# Patient Record
Sex: FEMALE | Race: WHITE | NOT HISPANIC OR LATINO | Employment: FULL TIME | URBAN - METROPOLITAN AREA
[De-identification: names, ages, dates, MRNs, and addresses within clinical notes are randomized per-mention and may not be internally consistent; named-entity substitution may affect disease eponyms.]

---

## 2017-02-26 ENCOUNTER — OFFICE VISIT (OUTPATIENT)
Dept: RETAIL CLINIC | Facility: CLINIC | Age: 22
End: 2017-02-26

## 2017-02-26 DIAGNOSIS — J06.9 ACUTE UPPER RESPIRATORY INFECTION: ICD-10-CM

## 2017-02-26 DIAGNOSIS — J10.1 INFLUENZA A: Primary | ICD-10-CM

## 2017-02-26 LAB
EXPIRATION DATE: ABNORMAL
FLUAV AG NPH QL: POSITIVE
FLUBV AG NPH QL: NEGATIVE
INTERNAL CONTROL: ABNORMAL
Lab: ABNORMAL

## 2017-02-26 PROCEDURE — 99213 OFFICE O/P EST LOW 20 MIN: CPT | Performed by: NURSE PRACTITIONER

## 2017-02-26 PROCEDURE — 87804 INFLUENZA ASSAY W/OPTIC: CPT | Performed by: NURSE PRACTITIONER

## 2017-03-02 NOTE — PATIENT INSTRUCTIONS
See scanned documents   Computer issue not able to document on the epic  Talked to the patient about the diagnosis and educate the patient and advise to visit to PCP if the symptoms worsens

## 2017-07-19 ENCOUNTER — OFFICE VISIT (OUTPATIENT)
Dept: OBSTETRICS AND GYNECOLOGY | Age: 22
End: 2017-07-19

## 2017-07-19 VITALS
SYSTOLIC BLOOD PRESSURE: 120 MMHG | BODY MASS INDEX: 18.49 KG/M2 | WEIGHT: 122 LBS | HEIGHT: 68 IN | DIASTOLIC BLOOD PRESSURE: 78 MMHG

## 2017-07-19 DIAGNOSIS — Z01.419 WELL WOMAN EXAM WITH ROUTINE GYNECOLOGICAL EXAM: Primary | ICD-10-CM

## 2017-07-19 DIAGNOSIS — Z30.41 ENCOUNTER FOR SURVEILLANCE OF CONTRACEPTIVE PILLS: ICD-10-CM

## 2017-07-19 PROCEDURE — 99395 PREV VISIT EST AGE 18-39: CPT | Performed by: OBSTETRICS & GYNECOLOGY

## 2017-07-19 RX ORDER — NORGESTIMATE AND ETHINYL ESTRADIOL 0.25-0.035
1 KIT ORAL DAILY
Qty: 28 TABLET | Refills: 12 | Status: SHIPPED | OUTPATIENT
Start: 2017-07-19 | End: 2018-05-23 | Stop reason: SINTOL

## 2017-07-19 RX ORDER — MULTIPLE VITAMINS W/ MINERALS TAB 9MG-400MCG
1 TAB ORAL DAILY
COMMUNITY

## 2017-07-19 NOTE — PROGRESS NOTES
ANNUAL GYN EXAM        2017    Subjective     Richelle Burgess is a 21 y.o., , White  Female.    Chief Complaint   Patient presents with   • Gynecologic Exam     Annual       History of Present Illness:Here for annual exam, and contraceptive refill exam.     Gyn Complaints: None.    1.Menstrual Hx: Regular cycles on Sprintec. Moderate cramping x 2 days. Not heavy menses(x/c one day).    2.Pap Smear Hx: Reviewed new pap guidelines,  st Pap will be next year.    3.Breast / Ovarian Hx: Fairly Regular SBE.       Family history of breast cancer: None     Family history of ovarian cancer: None    4.Family Hx of Colon Ca: None     Family Hx of Uterine Ca: None    5. New Past Medical History: None   Past Medical History:   Diagnosis Date   • Contraception management    • History of chicken pox    • Scoliosis     Mild Scoliosis, no brace        6. New Past Surgical History: None   Past Surgical History:   Procedure Laterality Date   • CHOLECYSTECTOMY  2015    Stones, Dr Norton        7. New Family Medical History: None   Family History   Problem Relation Age of Onset   • Hypertension Father    • Diabetes Brother      Type 1   • Diabetes Maternal Grandmother      Type 2   • Heart attack Paternal Grandfather    • Lung cancer Paternal Grandmother      COPD, smoker.   • Hypertension Maternal Grandfather          8.   OB History    Para Term  AB SAB TAB Ectopic Multiple Living   0 0 0 0 0 0 0 0 0 0              9.   Health Maintenance   Topic Date Due   • TDAP/TD VACCINES (1 - Tdap) 2015   • INFLUENZA VACCINE  2017       The following portions of the patient's history were reviewed / updated as appropriate: allergies, current medications, past medical history, past surgical history, past family history, past social history, and problem list.    Review of Systems   Allergic/Immunologic: Positive for environmental allergies (seasonal allergies).   All other systems reviewed and are  "negative.      Objective     /78  Ht 67.5\" (171.5 cm)  Wt 122 lb (55.3 kg)  LMP 07/17/2017  BMI 18.83 kg/m2    PHYSICAL EXAM    Constitutional: Well-developed, well-nourished, thin white female, in no distress, alert and well oriented ×3.    Skin is warm, dry, without rash.       Neck appears normal, trachea in the midline.  Thyroid exam normal.          No cervical lymphadenopathy.    Lungs clear to auscultation.  Chest wall appears normal.    Heart with regular rhythm without murmur or gallop.    Breasts:  Self breast exam was taught, technique reviewed, strongly encouraged.        Right breast nontender, without dominant mass.  No nipple discharge.            No superficial skin changes.  No axillary adenopathy.        Left breast nontender, without dominant mass.  No nipple discharge.            No superficial skin changes.  No axillary adenopathy.      Abdomen is flat, soft and nontender.No palpable mass.           No hepatosplenomegaly.  Flanks are negative.          Bowel sounds normal.  No abdominal bruit.          No inguinal lymphadenopathy bilaterally.     Pelvic exam :  Vulva normal.  Tampon was removed.          External genitalia normal.  BUS negative.             Introitus normal.  Vaginal mucosa normal.  Small amount of menses at cervix.          Cervix normal, GEN probe for GC and chlamydia cervix taken (DAJUAN), no Pap smear done, no cervical motion tenderness.          Uterus anteverted and small size, normal shape, and position.          Adnexa are negative bilaterally.  No tenderness.  No palpable mass.          Rectovaginal exam deferred.    Musc /Skel: Lower extremities without edema.     Neuro: Coordination is grossly normal.  Gait is normal.    Psych: Mood and affect is normal.  Behavior normal.  Thought content normal.            Judgment normal.        Assessment/Plan   Richelle was seen today for gynecologic exam.    Diagnoses and all orders for this visit:    Well woman exam with " "routine gynecological exam  -     Chlamydia / GC DAJUAN, Confirmation    Encounter for surveillance of contraceptive pills  -     norgestimate-ethinyl estradiol (SPRINTEC 28) 0.25-35 MG-MCG per tablet; Take 1 tablet by mouth Daily.      COMMENTS: This was a very normal exam for Richelle.  Her Sprintec has been refilled.  She does regular [facility] [employer]  We discussed diet and also discussed calcium intake with either 2 glasses of milk a day or 600 mg of calcium (Os-Reese or Caltrate).    Ted Cooper MD          "

## 2017-07-23 LAB
C TRACH RRNA SPEC QL NAA+PROBE: NEGATIVE
N GONORRHOEA RRNA SPEC QL NAA+PROBE: NEGATIVE

## 2017-07-24 ENCOUNTER — TELEPHONE (OUTPATIENT)
Dept: OBSTETRICS AND GYNECOLOGY | Age: 22
End: 2017-07-24

## 2017-07-24 NOTE — TELEPHONE ENCOUNTER
----- Message from Ted Cooper MD sent at 7/23/2017 11:43 AM EDT -----  Notify that the vaginal/cervical swab for gonorrhea and chlamydia was negative.

## 2017-09-29 ENCOUNTER — HOSPITAL ENCOUNTER (EMERGENCY)
Facility: HOSPITAL | Age: 22
Discharge: HOME | End: 2017-09-29
Attending: EMERGENCY MEDICINE | Admitting: EMERGENCY MEDICINE

## 2017-09-29 VITALS
BODY MASS INDEX: 18.83 KG/M2 | SYSTOLIC BLOOD PRESSURE: 122 MMHG | TEMPERATURE: 97 F | RESPIRATION RATE: 15 BRPM | DIASTOLIC BLOOD PRESSURE: 86 MMHG | OXYGEN SATURATION: 100 % | HEART RATE: 93 BPM | HEIGHT: 67 IN | WEIGHT: 120 LBS

## 2017-09-29 DIAGNOSIS — N12 PYELONEPHRITIS: Primary | ICD-10-CM

## 2017-09-29 LAB
ALBUMIN SERPL-MCNC: 4.6 G/DL (ref 3.5–5.2)
ALBUMIN/GLOB SERPL: 1.5 G/DL
ALP SERPL-CCNC: 58 U/L (ref 39–117)
ALT SERPL W P-5'-P-CCNC: 10 U/L (ref 1–33)
ANION GAP SERPL CALCULATED.3IONS-SCNC: 14.6 MMOL/L
AST SERPL-CCNC: 11 U/L (ref 1–32)
BACTERIA UR QL AUTO: ABNORMAL /HPF
BASOPHILS # BLD AUTO: 0.04 10*3/MM3 (ref 0–0.2)
BASOPHILS NFR BLD AUTO: 0.3 % (ref 0–1.5)
BILIRUB SERPL-MCNC: 0.8 MG/DL (ref 0.1–1.2)
BILIRUB UR QL STRIP: NEGATIVE
BUN BLD-MCNC: 11 MG/DL (ref 6–20)
BUN/CREAT SERPL: 13.3 (ref 7–25)
CALCIUM SPEC-SCNC: 9.6 MG/DL (ref 8.6–10.5)
CHLORIDE SERPL-SCNC: 98 MMOL/L (ref 98–107)
CLARITY UR: ABNORMAL
CO2 SERPL-SCNC: 28.4 MMOL/L (ref 22–29)
COLOR UR: YELLOW
CREAT BLD-MCNC: 0.83 MG/DL (ref 0.57–1)
DEPRECATED RDW RBC AUTO: 43.4 FL (ref 37–54)
EOSINOPHIL # BLD AUTO: 0.09 10*3/MM3 (ref 0–0.7)
EOSINOPHIL NFR BLD AUTO: 0.6 % (ref 0.3–6.2)
ERYTHROCYTE [DISTWIDTH] IN BLOOD BY AUTOMATED COUNT: 11.9 % (ref 11.7–13)
GFR SERPL CREATININE-BSD FRML MDRD: 87 ML/MIN/1.73
GLOBULIN UR ELPH-MCNC: 3.1 GM/DL
GLUCOSE BLD-MCNC: 101 MG/DL (ref 65–99)
GLUCOSE UR STRIP-MCNC: NEGATIVE MG/DL
HCG SERPL QL: NEGATIVE
HCT VFR BLD AUTO: 48.1 % (ref 35.6–45.5)
HGB BLD-MCNC: 16.2 G/DL (ref 11.9–15.5)
HGB UR QL STRIP.AUTO: ABNORMAL
HOLD SPECIMEN: NORMAL
HOLD SPECIMEN: NORMAL
HYALINE CASTS UR QL AUTO: ABNORMAL /LPF
IMM GRANULOCYTES # BLD: 0.05 10*3/MM3 (ref 0–0.03)
IMM GRANULOCYTES NFR BLD: 0.3 % (ref 0–0.5)
KETONES UR QL STRIP: NEGATIVE
LEUKOCYTE ESTERASE UR QL STRIP.AUTO: ABNORMAL
LIPASE SERPL-CCNC: 55 U/L (ref 13–60)
LYMPHOCYTES # BLD AUTO: 2.37 10*3/MM3 (ref 0.9–4.8)
LYMPHOCYTES NFR BLD AUTO: 16.2 % (ref 19.6–45.3)
MCH RBC QN AUTO: 33.6 PG (ref 26.9–32)
MCHC RBC AUTO-ENTMCNC: 33.7 G/DL (ref 32.4–36.3)
MCV RBC AUTO: 99.8 FL (ref 80.5–98.2)
MONOCYTES # BLD AUTO: 0.85 10*3/MM3 (ref 0.2–1.2)
MONOCYTES NFR BLD AUTO: 5.8 % (ref 5–12)
NEUTROPHILS # BLD AUTO: 11.19 10*3/MM3 (ref 1.9–8.1)
NEUTROPHILS NFR BLD AUTO: 76.8 % (ref 42.7–76)
NITRITE UR QL STRIP: NEGATIVE
PH UR STRIP.AUTO: 6.5 [PH] (ref 5–8)
PLATELET # BLD AUTO: 294 10*3/MM3 (ref 140–500)
PMV BLD AUTO: 9.9 FL (ref 6–12)
POTASSIUM BLD-SCNC: 3.9 MMOL/L (ref 3.5–5.2)
PROT SERPL-MCNC: 7.7 G/DL (ref 6–8.5)
PROT UR QL STRIP: ABNORMAL
RBC # BLD AUTO: 4.82 10*6/MM3 (ref 3.9–5.2)
RBC # UR: ABNORMAL /HPF
REF LAB TEST METHOD: ABNORMAL
SODIUM BLD-SCNC: 141 MMOL/L (ref 136–145)
SP GR UR STRIP: 1.01 (ref 1–1.03)
SQUAMOUS #/AREA URNS HPF: ABNORMAL /HPF
UROBILINOGEN UR QL STRIP: ABNORMAL
WBC NRBC COR # BLD: 14.59 10*3/MM3 (ref 4.5–10.7)
WBC UR QL AUTO: ABNORMAL /HPF
WHOLE BLOOD HOLD SPECIMEN: NORMAL
WHOLE BLOOD HOLD SPECIMEN: NORMAL

## 2017-09-29 PROCEDURE — 87086 URINE CULTURE/COLONY COUNT: CPT | Performed by: EMERGENCY MEDICINE

## 2017-09-29 PROCEDURE — 87186 SC STD MICRODIL/AGAR DIL: CPT | Performed by: EMERGENCY MEDICINE

## 2017-09-29 PROCEDURE — 85025 COMPLETE CBC W/AUTO DIFF WBC: CPT | Performed by: EMERGENCY MEDICINE

## 2017-09-29 PROCEDURE — 80053 COMPREHEN METABOLIC PANEL: CPT | Performed by: EMERGENCY MEDICINE

## 2017-09-29 PROCEDURE — 81001 URINALYSIS AUTO W/SCOPE: CPT | Performed by: EMERGENCY MEDICINE

## 2017-09-29 PROCEDURE — 84703 CHORIONIC GONADOTROPIN ASSAY: CPT | Performed by: EMERGENCY MEDICINE

## 2017-09-29 PROCEDURE — 99284 EMERGENCY DEPT VISIT MOD MDM: CPT

## 2017-09-29 PROCEDURE — 36415 COLL VENOUS BLD VENIPUNCTURE: CPT

## 2017-09-29 PROCEDURE — 83690 ASSAY OF LIPASE: CPT | Performed by: EMERGENCY MEDICINE

## 2017-09-29 RX ORDER — HYDROCODONE BITARTRATE AND ACETAMINOPHEN 5; 325 MG/1; MG/1
1 TABLET ORAL EVERY 4 HOURS PRN
Qty: 12 TABLET | Refills: 0 | Status: SHIPPED | OUTPATIENT
Start: 2017-09-29 | End: 2017-10-17

## 2017-09-29 RX ORDER — CIPROFLOXACIN 500 MG/1
500 TABLET, FILM COATED ORAL EVERY 12 HOURS
Qty: 10 TABLET | Refills: 0 | Status: SHIPPED | OUTPATIENT
Start: 2017-09-29 | End: 2017-10-17

## 2017-09-29 RX ORDER — LEVOFLOXACIN 250 MG/1
500 TABLET ORAL ONCE
Status: COMPLETED | OUTPATIENT
Start: 2017-09-29 | End: 2017-09-29

## 2017-09-29 RX ORDER — SODIUM CHLORIDE 0.9 % (FLUSH) 0.9 %
10 SYRINGE (ML) INJECTION AS NEEDED
Status: DISCONTINUED | OUTPATIENT
Start: 2017-09-29 | End: 2017-09-29 | Stop reason: HOSPADM

## 2017-09-29 RX ORDER — SPIRONOLACTONE 100 MG/1
100 TABLET, FILM COATED ORAL DAILY
COMMUNITY
End: 2020-06-17

## 2017-09-29 RX ADMIN — LEVOFLOXACIN 500 MG: 250 TABLET, FILM COATED ORAL at 04:15

## 2017-10-01 LAB
BACTERIA SPEC AEROBE CULT: ABNORMAL
BACTERIA SPEC AEROBE CULT: ABNORMAL

## 2017-10-06 ENCOUNTER — TELEPHONE (OUTPATIENT)
Dept: SOCIAL WORK | Facility: HOSPITAL | Age: 22
End: 2017-10-06

## 2017-10-16 ENCOUNTER — TELEPHONE (OUTPATIENT)
Dept: OBSTETRICS AND GYNECOLOGY | Age: 22
End: 2017-10-16

## 2017-10-17 ENCOUNTER — OFFICE VISIT (OUTPATIENT)
Dept: OBSTETRICS AND GYNECOLOGY | Age: 22
End: 2017-10-17

## 2017-10-17 VITALS
BODY MASS INDEX: 19.25 KG/M2 | WEIGHT: 127 LBS | DIASTOLIC BLOOD PRESSURE: 68 MMHG | HEIGHT: 68 IN | SYSTOLIC BLOOD PRESSURE: 112 MMHG

## 2017-10-17 DIAGNOSIS — N76.1 SUBACUTE VAGINITIS: ICD-10-CM

## 2017-10-17 DIAGNOSIS — R39.15 URGENCY OF URINATION: ICD-10-CM

## 2017-10-17 DIAGNOSIS — N12 PYELONEPHRITIS: Primary | ICD-10-CM

## 2017-10-17 DIAGNOSIS — R35.0 URINE FREQUENCY: ICD-10-CM

## 2017-10-17 DIAGNOSIS — N72 CERVICITIS: ICD-10-CM

## 2017-10-17 LAB
BILIRUB BLD-MCNC: NEGATIVE MG/DL
GLUCOSE UR STRIP-MCNC: NEGATIVE MG/DL
KETONES UR QL: NEGATIVE
LEUKOCYTE EST, POC: ABNORMAL
NITRITE UR-MCNC: NEGATIVE MG/ML
PH UR: 7 [PH] (ref 5–8)
PROT UR STRIP-MCNC: NEGATIVE MG/DL
RBC # UR STRIP: NEGATIVE /UL
SP GR UR: 1.02 (ref 1–1.03)
UROBILINOGEN UR QL: NORMAL

## 2017-10-17 PROCEDURE — 87220 TISSUE EXAM FOR FUNGI: CPT | Performed by: OBSTETRICS & GYNECOLOGY

## 2017-10-17 PROCEDURE — 99213 OFFICE O/P EST LOW 20 MIN: CPT | Performed by: OBSTETRICS & GYNECOLOGY

## 2017-10-17 PROCEDURE — 87210 SMEAR WET MOUNT SALINE/INK: CPT | Performed by: OBSTETRICS & GYNECOLOGY

## 2017-10-17 PROCEDURE — 81003 URINALYSIS AUTO W/O SCOPE: CPT | Performed by: OBSTETRICS & GYNECOLOGY

## 2017-10-17 NOTE — PROGRESS NOTES
" GYN PROBLEM EXAM                                    2017    Subjective   Richelle Burgess is a 22 y.o. White Female,      Chief complaint:  Follow-up (Richelle was recently treated for pyelonephritis in the ER on her  birthday.  She continues to have symptoms, Urine frequency, urgency, dark urine color.  Also, she states vaginal discharge has fishy odor. )    History of Present Illness   Richelle was seen in the emergency room on her birthday , and treated for pyelonephritis.  After she finished her antibiotics, she has noticed the same symptoms of urgency, frequency, dark urine color, but no dysuria.  She has also noticed a vaginal discharge with fishy odor particularly after she has had intercourse.  However she does not have any itching or irritation.  They're having intercourse about 2 times a week.    The following portions of the patient's history were reviewed / updated as appropriate:   allergies, current medications,  past medical history, past surgical history, past family history, past social history, and problem list.    Review of Systems   Denies fever or chills.  No abdominal pain or change in bowel movements.    /68  Ht 67.5\" (171.5 cm)  Wt 127 lb (57.6 kg)  LMP 2017  Breastfeeding? No  BMI 19.6 kg/m2    Objective   OBGyn Exam     PHYSICAL EXAM     Well-developed, well-nourished, thin  female, in no distress, alert and well oriented ×3.       Abdomen is flat, soft and nontender.       Pelvic exam :  Vulva normal.           External genitalia normal.  BUS negative.             Introitus normal.  Vaginal mucosa normal.  Fairly copious yellow discharge.  However this is secondary to a marked cervical eversion.  Wet prep shows many WBCs, but no clue cells, no Trichomonas, no yeast.          Cervix normal, with a marked cervical eversion.  Gen-Probe for GC and chlamydia taken.  NuSwab of the vagina for vaginitis.           No cervical motion " tenderness.          Uterus midplane to anteverted, nontender, small size, normal shape, and position.          Adnexa are negative bilaterally.  No tenderness.  No palpable mass.          Rectovaginal exam deferred .         Mood and affect is normal.  Behavior normal.  Thought content normal.            Judgment normal.         Assessment/Plan   Richelle was seen today for follow-up.    Diagnoses and all orders for this visit:    Pyelonephritis  -     Urinalysis With / Microscopic If Indicated - Urine, Clean Catch  -     Urine Culture - Urine, Urine, Clean Catch  -     POC Urinalysis Dipstick, Automated    Urine frequency  -     POC Urinalysis Dipstick, Automated    Urgency of urination  -     POC Urinalysis Dipstick, Automated    Cervicitis  -     Chlamydia trachomatis, Neisseria gonorrhoeae, PCR w/ confirmation - Swab, Cervix    Subacute vaginitis  -     NuSwab Vaginitis (VG) - Swab, Vagina     COMMENTS: Because of the fishy odor that she notices after intercourse, we will check for bacterial vaginosis, but I don't think she has it based on the wet prep.  We will also repeat a urine culture to make sure she is not trying to start a new UTI.  We discussed strategies to avoid UTIs after intercourse.    Ted Cooper MD  10/18/2017

## 2017-10-19 LAB
APPEARANCE UR: ABNORMAL
BACTERIA #/AREA URNS HPF: ABNORMAL /HPF
BACTERIA UR CULT: ABNORMAL
BACTERIA UR CULT: ABNORMAL
BILIRUB UR QL STRIP: NEGATIVE
CASTS URNS MICRO: ABNORMAL
COLOR UR: YELLOW
EPI CELLS #/AREA URNS HPF: ABNORMAL /HPF
GLUCOSE UR QL: NEGATIVE
HGB UR QL STRIP: NEGATIVE
KETONES UR QL STRIP: NEGATIVE
LEUKOCYTE ESTERASE UR QL STRIP: ABNORMAL
NITRITE UR QL STRIP: NEGATIVE
OTHER ANTIBIOTIC SUSC ISLT: ABNORMAL
PH UR STRIP: 6.5 [PH] (ref 5–8)
PROT UR QL STRIP: ABNORMAL
RBC #/AREA URNS HPF: ABNORMAL /HPF
SP GR UR: 1.02 (ref 1–1.03)
UROBILINOGEN UR STRIP-MCNC: ABNORMAL MG/DL
WBC #/AREA URNS HPF: ABNORMAL /HPF

## 2017-10-20 ENCOUNTER — TELEPHONE (OUTPATIENT)
Dept: OBSTETRICS AND GYNECOLOGY | Age: 22
End: 2017-10-20

## 2017-10-20 RX ORDER — CIPROFLOXACIN 500 MG/1
500 TABLET, FILM COATED ORAL 2 TIMES DAILY
Qty: 14 TABLET | Refills: 0 | Status: SHIPPED | OUTPATIENT
Start: 2017-10-20 | End: 2017-10-27

## 2017-10-20 NOTE — TELEPHONE ENCOUNTER
Per Dr. Cooper  Pt urine culture was positive for a UTI  erx'd Cipro 500 mg #14  1 BID for 7 days     Pt notified

## 2017-10-21 LAB
C TRACH RRNA SPEC QL NAA+PROBE: NEGATIVE
N GONORRHOEA RRNA SPEC QL NAA+PROBE: NEGATIVE

## 2017-10-22 NOTE — PROGRESS NOTES
Notify Richelle that her urine culture showed greater than 100,000 colonies of Klebsiella.  It is resistant to Macrobid and sulfa but sensitive to Cipro.  Recommend ciprofloxacin 500 mg twice a day for 7 days, #14.  We might have called Richelle on Friday !!

## 2017-10-23 LAB
A VAGINAE DNA VAG QL NAA+PROBE: NORMAL SCORE
BVAB2 DNA VAG QL NAA+PROBE: NORMAL SCORE
C ALBICANS DNA VAG QL NAA+PROBE: NEGATIVE
C GLABRATA DNA VAG QL NAA+PROBE: NEGATIVE
MEGA1 DNA VAG QL NAA+PROBE: NORMAL SCORE
T VAGINALIS RRNA SPEC QL NAA+PROBE: NEGATIVE

## 2017-10-25 NOTE — PROGRESS NOTES
Notify Richelle that her test for BV and yeast and Trichomonas all came back negative.  The tests for gonorrhea and chlamydia were also negative.  So the only problem she really needs to deal with is getting the urinary tract infection cleared up.

## 2017-11-17 ENCOUNTER — TELEPHONE (OUTPATIENT)
Dept: OBSTETRICS AND GYNECOLOGY | Age: 22
End: 2017-11-17

## 2017-11-17 RX ORDER — NITROFURANTOIN 25; 75 MG/1; MG/1
100 CAPSULE ORAL 2 TIMES DAILY
Qty: 14 CAPSULE | Refills: 0 | Status: SHIPPED | OUTPATIENT
Start: 2017-11-17 | End: 2017-11-24

## 2018-05-22 NOTE — PROGRESS NOTES
Routine Annual Visit    2018    Patient: Richelle Burgess          MR#:[id number]    Chief Complaint   Patient presents with   • Gynecologic Exam     RICHELLE IS HERE FOR HER ANNUAL EXAM.  SHE HAS NEVER HAD A PAP SMEAR BEFORE. SHE IS EXPERIENCING IRREGULAR CYCLES SINCE STOPPING BCP IN JANUARY.  ALSO, PAIN WITH INTERCOURSE WITH BLEEDING AFTERWARDS.        22 y.o. female  who presents for annual exam.     HISTORY OF PRESENT ILLNESS:    GYN Complaints:  Discontinued BCP in 2018 due to side effects (mood). Now using condoms. She is having pain with intercourse and post coital bleeding.  Bright Red blood, small amount when she wipes. Pain is central and deep in the pelvis, not at the introitus. Doesn't alternate from side to side.     No change in discharge. No vaginal itching or irritation. No vaginal odor. No Recent antibiotics.    Other Complaints: None.    GYN HISTORY:  1.  Menstrual Hx:  Cycles have been irregular since stopping BCP in 2018,  cycle in Feb, None in March, and April, two cycles in May.                Current contraception: Condoms.    2.  History of abnormal Pap smear: no.        Pap smear Hx: First pap smear will be this year    NEW PAST MEDICAL HISTORY:    3.  New Medical Hx:  None.  Past Medical History:   Diagnosis Date   • Contraception management    • History of chicken pox    • Scoliosis     Mild Scoliosis, no brace           4.  New Surgical Hx:  None.  Past Surgical History:   Procedure Laterality Date   • CHOLECYSTECTOMY  2015    Stones, Dr Norton           5.  New Family Medical Hx:  None.   Family History   Problem Relation Age of Onset   • Hypertension Father    • Diabetes Brother         Type 1   • Diabetes Maternal Grandmother         Type 2   • Heart attack Paternal Grandfather    • Lung cancer Paternal Grandmother         COPD, smoker.   • Hypertension Maternal Grandfather    • Stroke Maternal Grandfather         Residual disability.         6.   "SCREENINGS:  =Family history of breast cancer: no  Perform regular self breast exam: yes - monthly    =Family history of ovarian cancer: no  =Family history of uterine cancer: no  =Family History of colon cancer: no      Mammogram: not indicated.  Colonoscopy: not indicated.  DEXA: not indicated.    7.  LIFESTYLE:  =Diet: Regular, Healthy.     =Exercise:  yes - three times per week.   =I recommended 30 minutes of aerobic exercise 5 times a week.     =Calcium  yes - Multivit.  =Vitamin D:  yes - Multivit.   = We discussed calcium intake needs to help prevent osteoporosis:      Recommended 1200 mg of calcium daily and 2000 IUs of vitamin D 3 daily.      Review of Systems   Constitutional: Negative.    HENT: Negative.    Genitourinary: Positive for dyspareunia and menstrual problem.        BLEEDING AFTER INTERCOURSE    All other systems reviewed and are negative.      Objective     /72   Ht 171.5 cm (67.5\")   Wt 54.9 kg (121 lb)   LMP 05/19/2018 (Exact Date)   Breastfeeding? No   BMI 18.67 kg/m²     PHYSICAL EXAM    Constitutional: Well-developed, well-nourished, thin  female, in no distress, alert and well oriented ×3.    Skin is warm, dry, without rash.       Neck appears normal, trachea in the midline.  Thyroid exam normal.          No cervical lymphadenopathy.    Lungs clear to auscultation.  Chest wall appears normal.    Heart with regular rhythm without murmur or gallop.    Breasts:         Right breast nontender, without dominant mass.  No nipple discharge.            No superficial skin changes.  No axillary adenopathy.        Left breast nontender, without dominant mass.  No nipple discharge.            No superficial skin changes.  No axillary adenopathy.      Abdomen is flat, soft and nontender.No palpable mass.           No hepatosplenomegaly.  Flanks are negative.          Bowel sounds normal.  No abdominal bruit.          No inguinal lymphadenopathy bilaterally.     Pelvic exam :  " Vulva normal.           External genitalia normal.  A little bit of menstrual blood present, no evidence of trauma.  BUS negative.             Introitus normal.  No lesions or evidence of trauma.  Vaginal mucosa normal.  Dark menses present.           Cervix normal, moderate cervical eversion, no polyps or lesions, Pap with reflex, might be obscured by blood..  No cervical motion tenderness.          Uterus anteverted, nontender, no tenderness with motion, normal size, normal shape, and position.          Adnexa: Right side negative, no tenderness no palpable mass.  Left side a little tender and a vague fullness present, ovary may be postovulatory.          Rectovaginal exam confirms that there is no mass in the pelvis. .      Musc /Skel: Lower extremities without edema.     Neuro: Coordination is grossly normal.  Gait is normal.    Psych: Mood and affect is normal.  Behavior normal.  Thought content normal.            Judgment normal.       PELVIC ULTRASOUND        Uterus:  Anteverted.  Measures 7.8 x 4.1 x 3.0 cm, with a volume of 51.32 cc's.  Endometrial stripe measures 3.36 mm and looks homogeneous and normal.  There were couple small nabothian cyst in the upper cervix and the lower uterine segment.     Left ovary:  Measures 3.6 x 2.5 x 1.9 cm, with a volume of 8.85 cc's.  There are multiple small follicles, couple little over a centimeter.  The left adnexa was more tender on exam than the right.     Right ovary:  Measures 4.9 x 4.3 x 3.1 cm, with a volume of 33.56 cc.  It contains a complex cyst with irregular collections of debris measuring 4.1 x 3.0 cm, and looks typical for a hemorrhagic ovarian cyst.  It is not homogeneous like an endometrioma.  Does not have any bright echoes like a dermoid cyst.     Cul-de-sac:  Contains a trace of free fluid. No masses.       Assessment:  Normal uterus and endometrial cavity.  Multiple small follicular cysts in the left ovary, a little tender on exam.  4 cm complex cyst  in the right ovary, not tender on exam, but has appearance of a hemorrhagic corpus luteal cyst.    Recommendation:  Cautioned about vigorous intercourse.  Don't want the right cyst to rupture.  I recommend repeating the ultrasound in 6 weeks to document started resolution of the right ovarian cyst.  Meanwhile we will check a  and a quantitative hCG.      =All questions were answered.      Assessment/Plan   Richelle was seen today for gynecologic exam.    Diagnoses and all orders for this visit:    Well woman exam with routine gynecological exam  -     POC Urinalysis Dipstick, Automated  -     Pap IG, Ct-Ng, Rfx HPV All    Screen for STD (sexually transmitted disease)  -     Pap IG, Ct-Ng, Rfx HPV All    PCB (post coital bleeding)    Dyspareunia, female    Cysts of both ovaries  -       -     HCG, B-subunit, Quantitative        COMMENTS: Her bleeding pattern and pelvic exam suggests ovulation bleeding currently.  The pelvic ultrasound shows a medium size complex cyst in the right ovary, that is surprisingly asymptomatic, since it looks most like a hemorrhagic corpus luteum.  However a dermoid cyst as a possibility.  Her discomfort was more in the left adnexa, and the left ovary had multiple small follicle cysts.  I will check a  and hCG, and repeat the ultrasound in 6-7 weeks.     Ted Cooper MD  5/23/2018

## 2018-05-23 ENCOUNTER — OFFICE VISIT (OUTPATIENT)
Dept: OBSTETRICS AND GYNECOLOGY | Age: 23
End: 2018-05-23

## 2018-05-23 ENCOUNTER — PROCEDURE VISIT (OUTPATIENT)
Dept: OBSTETRICS AND GYNECOLOGY | Age: 23
End: 2018-05-23

## 2018-05-23 VITALS
DIASTOLIC BLOOD PRESSURE: 72 MMHG | BODY MASS INDEX: 18.34 KG/M2 | HEIGHT: 68 IN | SYSTOLIC BLOOD PRESSURE: 104 MMHG | WEIGHT: 121 LBS

## 2018-05-23 DIAGNOSIS — R10.2 PELVIC PAIN: Primary | ICD-10-CM

## 2018-05-23 DIAGNOSIS — Z11.3 SCREEN FOR STD (SEXUALLY TRANSMITTED DISEASE): ICD-10-CM

## 2018-05-23 DIAGNOSIS — Z01.419 WELL WOMAN EXAM WITH ROUTINE GYNECOLOGICAL EXAM: Primary | ICD-10-CM

## 2018-05-23 DIAGNOSIS — N83.201 CYST OF RIGHT OVARY: ICD-10-CM

## 2018-05-23 DIAGNOSIS — N94.10 DYSPAREUNIA, FEMALE: ICD-10-CM

## 2018-05-23 DIAGNOSIS — N83.202 CYSTS OF BOTH OVARIES: ICD-10-CM

## 2018-05-23 DIAGNOSIS — N83.201 CYSTS OF BOTH OVARIES: ICD-10-CM

## 2018-05-23 DIAGNOSIS — N93.0 PCB (POST COITAL BLEEDING): ICD-10-CM

## 2018-05-23 LAB
BILIRUB BLD-MCNC: NEGATIVE MG/DL
CLARITY, POC: CLEAR
COLOR UR: YELLOW
GLUCOSE UR STRIP-MCNC: NEGATIVE MG/DL
KETONES UR QL: NEGATIVE
LEUKOCYTE EST, POC: NEGATIVE
NITRITE UR-MCNC: NEGATIVE MG/ML
PH UR: 6 [PH] (ref 5–8)
PROT UR STRIP-MCNC: ABNORMAL MG/DL
RBC # UR STRIP: ABNORMAL /UL
SP GR UR: 1.02 (ref 1–1.03)
UROBILINOGEN UR QL: NORMAL

## 2018-05-23 PROCEDURE — 99213 OFFICE O/P EST LOW 20 MIN: CPT | Performed by: OBSTETRICS & GYNECOLOGY

## 2018-05-23 PROCEDURE — 99395 PREV VISIT EST AGE 18-39: CPT | Performed by: OBSTETRICS & GYNECOLOGY

## 2018-05-23 PROCEDURE — 81003 URINALYSIS AUTO W/O SCOPE: CPT | Performed by: OBSTETRICS & GYNECOLOGY

## 2018-05-23 PROCEDURE — 76830 TRANSVAGINAL US NON-OB: CPT | Performed by: OBSTETRICS & GYNECOLOGY

## 2018-05-24 LAB
CANCER AG125 SERPL-ACNC: 18.1 U/ML (ref 0–38.1)
HCG INTACT+B SERPL-ACNC: <0.5 MIU/ML

## 2018-05-25 LAB
C TRACH RRNA CVX QL NAA+PROBE: NEGATIVE
CONV .: NORMAL
CYTOLOGIST CVX/VAG CYTO: NORMAL
CYTOLOGY CVX/VAG DOC THIN PREP: NORMAL
DX ICD CODE: NORMAL
HIV 1 & 2 AB SER-IMP: NORMAL
N GONORRHOEA RRNA CVX QL NAA+PROBE: NEGATIVE
OTHER STN SPEC: NORMAL
PATH REPORT.FINAL DX SPEC: NORMAL
STAT OF ADQ CVX/VAG CYTO-IMP: NORMAL

## 2018-05-30 ENCOUNTER — TELEPHONE (OUTPATIENT)
Dept: OBSTETRICS AND GYNECOLOGY | Age: 23
End: 2018-05-30

## 2018-05-30 NOTE — PROGRESS NOTES
May 29, 2018.  9:50 PM.  I called Richelle to let her daughter that her Pap smear was negative and also negative for GC and chlamydia.  Her serum pregnancy test was negative and the  was normal at 18.  She had some blood in her urine but she was at the end of her cycle.  We scheduled her for a follow-up pelvic ultrasound to check her ovarian cyst on July 6.  However I realized I will be out of town at [place] and so would prefer THAT WE SCHEDULE HER FOR THE FOLLOWING FRIDAY (I THINK SHE IS BETTER ON FRIDAYS) AND I CAN JUST MAKE A POINT OF BEING HERE THEN.

## 2018-07-13 ENCOUNTER — OFFICE VISIT (OUTPATIENT)
Dept: OBSTETRICS AND GYNECOLOGY | Age: 23
End: 2018-07-13

## 2018-07-13 ENCOUNTER — PROCEDURE VISIT (OUTPATIENT)
Dept: OBSTETRICS AND GYNECOLOGY | Age: 23
End: 2018-07-13

## 2018-07-13 VITALS
SYSTOLIC BLOOD PRESSURE: 114 MMHG | WEIGHT: 122 LBS | HEIGHT: 68 IN | DIASTOLIC BLOOD PRESSURE: 62 MMHG | BODY MASS INDEX: 18.49 KG/M2

## 2018-07-13 DIAGNOSIS — N83.201 CYST OF RIGHT OVARY: Primary | ICD-10-CM

## 2018-07-13 PROCEDURE — 99213 OFFICE O/P EST LOW 20 MIN: CPT | Performed by: OBSTETRICS & GYNECOLOGY

## 2018-07-13 PROCEDURE — 76830 TRANSVAGINAL US NON-OB: CPT | Performed by: OBSTETRICS & GYNECOLOGY

## 2018-07-13 RX ORDER — ETONOGESTREL AND ETHINYL ESTRADIOL 11.7; 2.7 MG/1; MG/1
INSERT, EXTENDED RELEASE VAGINAL
Qty: 1 EACH | Refills: 12 | Status: SHIPPED | OUTPATIENT
Start: 2018-07-13 | End: 2018-08-03 | Stop reason: SDUPTHER

## 2018-07-13 NOTE — PROGRESS NOTES
GYN FOLLOW UP/PELVIC ULTRASOUND            2018    Lew Burgess is a 22 y.o.  Female        Chief complaint:  Gynecologic Exam (Gyn u/s:follow up ovarian cyst)    History of Present Illness:   GYN history last visit May 23, 2018:      GYN Complaints:  Discontinued BCP in 2018 due to side effects (mood). Now using condoms. She is having pain with intercourse and post coital bleeding.  Bright Red blood, small amount when she wipes. Pain is central and deep in the pelvis, not at the introitus. Doesn't alternate from side to side.     No change in discharge. No vaginal itching or irritation. No vaginal odor. No Recent antibiotics.     GYN HISTORY:  1.  Menstrual Hx:  Cycles have been irregular since stopping BCP in 2018,  cycle in Feb, None in March, and April, two cycles in May.   2.  Contraception: Condoms.    PELVIC ULTRASOUND on May 23:      Uterus:  Anteverted.  Measures 7.8 x 4.1 x 3.0 cm, with a volume of 51.32 cc's.  Endometrial stripe measures 3.36 mm and looks homogeneous and normal.  There were couple small nabothian cyst in the upper cervix and the lower uterine segment.     Left ovary:  Measures 3.6 x 2.5 x 1.9 cm, with a volume of 8.85 cc's.  There are multiple small follicles, couple little over a centimeter.  The left adnexa was more tender on exam than the right.     Right ovary:  Measures 4.9 x 4.3 x 3.1 cm, with a volume of 33.56 cc.  It contains a complex cyst with irregular collections of debris measuring 4.1 x 3.0 cm, and looks typical for a hemorrhagic ovarian cyst.  It is not homogeneous like an endometrioma.  Does not have any bright echoes like a dermoid cyst.     Cul-de-sac:  Contains a trace of free fluid. No masses.       Assessment:  Normal uterus and endometrial cavity.  Multiple small follicular cysts in the left ovary, a little tender on exam.  4 cm complex cyst in the right ovary, not tender on exam, but has appearance of a  "hemorrhagic corpus luteal cyst.     Recommendation:  Cautioned about vigorous intercourse.  Don't want the right cyst to rupture.  I recommend repeating the ultrasound in 6 weeks to document started resolution of the right ovarian cyst.  Meanwhile we will check a  and a quantitative hCG.    Today's history:  was 19 in the hCG was negative.  LMP was June 22, 2018.  She still has some lower abdominal pain that seems to be more central as opposed to one side or the other.     Review of Systems: Still has some lower abdominal pain intermittently.  Does have intercourse, recommended carefully.  Some discomfort with deep penetration.    The following portions of the patient's history were reviewed / updated as appropriate:   allergies, current medications,  past medical history, past surgical history, past family history, past social history, and problem list.    Objective     /62   Ht 171.5 cm (67.5\")   Wt 55.3 kg (122 lb)   LMP 06/22/2018 (Approximate)   BMI 18.83 kg/m²     PHYSICAL EXAM     Gen'l : Thin  female, in no distress.  Well oriented ×3.       Abd   : No exam.       Pelvic: No exam.    PELVIC ULTRASOUND       Uterus: Anteverted.  Measures 7.5 x 4.2 x 3.3 cm, with a volume of 53.95 cc's.  Endometrial stripe is homogeneous and measures 5.0(4.98) mm.  No fibroids are seen.     Left ovary: Measures 2.5 x 1.4 x 1.0 cm, with a volume of 1.75 cc.  It looks pretty in-active.     Right ovary: Measures 4.8 x 5.5 x 3.1 cm, with a volume of 42.50 cc.  It contains either a large single complex cyst with resolving bands of hemorrhage or could be multiple focal smaller cysts measuring: 1.8 x 1.7 cm, 3.4 x 2.1 cm, measuring 2.4 x 1.8 cm.  There is very small, less than 1 cm cyst in the wall of one of the larger cyst.     Cul-de-sac: Contains no free fluid or masses.          ASSESSMENT: Overall hemorrhagic cyst of the right ovary with gradual evolution compared to previous exam on May 23.        " RECOMMENDATION: We discussed ovarian suppression with either birth control pills, which she did not tolerate well before, or NuvaRing or Depo-Provera.  Going over the pluses and minuses of these she has decided to pursue the NuvaRing and we will do this as a continuous application, not having withdrawal cycles.  Repeat her pelvic ultrasound after about 8 weeks.  She expects to start her current period this weekend.      Assessment/Plan   Richelle was seen today for gynecologic exam.    Diagnoses and all orders for this visit:    Cyst of right ovary  -     etonogestrel-ethinyl estradiol (NUVARING) 0.12-0.015 MG/24HR vaginal ring; Insert vaginally and leave in place for 3 consecutive weeks, then remove for 1 week.    COMMENTS: Pelvic ultrasound shows persistence of the right ovarian cyst although it appears to have evolved from a hemorrhagic ovarian cyst to either a single large cyst, slightly larger with resolving clot within it for several simple and complex cyst within the ovary.  It has not regressed and is producing mild symptoms.      As outlined in the recommendation above, she will start NuvaRing with her upcoming cycle which will probably be this weekend, using the Ring continuously for the next 3 months or so.  We will reevaluate the ovary with ultrasound after about 2 months.  She will call me if there is any worsening of her symptoms.    Ted Cooper M.D.         7/13/2018

## 2018-08-03 ENCOUNTER — TELEPHONE (OUTPATIENT)
Dept: OBSTETRICS AND GYNECOLOGY | Age: 23
End: 2018-08-03

## 2018-08-03 DIAGNOSIS — N83.201 CYST OF RIGHT OVARY: ICD-10-CM

## 2018-08-03 RX ORDER — ETONOGESTREL AND ETHINYL ESTRADIOL 11.7; 2.7 MG/1; MG/1
INSERT, EXTENDED RELEASE VAGINAL
Qty: 1 EACH | Refills: 12 | Status: SHIPPED | OUTPATIENT
Start: 2018-08-03 | End: 2020-06-17

## 2018-08-03 NOTE — TELEPHONE ENCOUNTER
I sent it in for her, but she can leave the nuva ring in place for 4 wks, then replace right away (it is effective for 4 wks)

## 2018-08-03 NOTE — TELEPHONE ENCOUNTER
Dr KEY has pt on Nuva ring continuously, could we send in new Rx reflecting this, ins is not allowing pt to  after 3 wks stating it is too soon, pharm on file, nka. . Please advise

## 2018-09-10 ENCOUNTER — TELEPHONE (OUTPATIENT)
Dept: OBSTETRICS AND GYNECOLOGY | Age: 23
End: 2018-09-10

## 2018-09-20 ENCOUNTER — OFFICE VISIT (OUTPATIENT)
Dept: OBSTETRICS AND GYNECOLOGY | Age: 23
End: 2018-09-20

## 2018-09-20 ENCOUNTER — PROCEDURE VISIT (OUTPATIENT)
Dept: OBSTETRICS AND GYNECOLOGY | Age: 23
End: 2018-09-20

## 2018-09-20 VITALS
BODY MASS INDEX: 18.49 KG/M2 | SYSTOLIC BLOOD PRESSURE: 124 MMHG | WEIGHT: 122 LBS | HEIGHT: 68 IN | DIASTOLIC BLOOD PRESSURE: 88 MMHG

## 2018-09-20 DIAGNOSIS — N92.6 IRREGULAR PERIODS/MENSTRUAL CYCLES: ICD-10-CM

## 2018-09-20 DIAGNOSIS — N92.6 IRREGULAR PERIODS/MENSTRUAL CYCLES: Primary | ICD-10-CM

## 2018-09-20 DIAGNOSIS — N83.201 CYST OF RIGHT OVARY: ICD-10-CM

## 2018-09-20 DIAGNOSIS — R10.2 PELVIC PAIN: ICD-10-CM

## 2018-09-20 DIAGNOSIS — N83.201 CYST OF RIGHT OVARY: Primary | ICD-10-CM

## 2018-09-20 LAB
B-HCG UR QL: NEGATIVE
INTERNAL NEGATIVE CONTROL: NEGATIVE
INTERNAL POSITIVE CONTROL: POSITIVE
Lab: NORMAL

## 2018-09-20 PROCEDURE — 99213 OFFICE O/P EST LOW 20 MIN: CPT | Performed by: OBSTETRICS & GYNECOLOGY

## 2018-09-20 PROCEDURE — 76830 TRANSVAGINAL US NON-OB: CPT | Performed by: OBSTETRICS & GYNECOLOGY

## 2018-09-20 PROCEDURE — 81025 URINE PREGNANCY TEST: CPT | Performed by: OBSTETRICS & GYNECOLOGY

## 2018-09-20 NOTE — PROGRESS NOTES
"GYN FOLLOW UP/PELVIC ULTRASOUND            2018.    Subjective   Richelle Burgess is a 22 y.o.  Female        Chief complaint:  Follow-up (irregular bleeding and ovarian cyst.  Using nuvaring in for two weeks out today.  Cycle started 2018 and did not stop until this .  Started bleeding again after sex on tuesday.  Pregnancy test = negative. )    History of Present Illness: Richelle is here for a follow-up ultrasound to evaluate her right ovarian cyst.      She had plan to use the NuvaRing continuously but left her first ring in for a total of 4 weeks, instead of 3, and then switched her NuvaRing, but then started bleeding about 2 weeks into the new NuvaRing.  Since 2017 she's had continuous bleeding until , when it finally stopped.  However she has had intercourse twice since then, and each time she has some postcoital bleeding.     She took the ring out today.     She has some pain with intercourse, it feels central, not sure if she feels it more on the right or left.  The pain has been less than what it was before.     Review of Systems: No vaginal discharge or itching or irritation.    The following portions of the patient's history were reviewed / updated as appropriate:   allergies, current medications,  past medical history, past surgical history, past family history, past social history, and problem list.    Objective     /88   Ht 171.5 cm (67.5\")   Wt 55.3 kg (122 lb)   LMP 2018   Breastfeeding? No   BMI 18.83 kg/m²     PHYSICAL EXAM     Gen'l : Well-developed, thin  female, in no distress.       Abd   : Abdomen flat, soft, not tender.       Pelvic: External genitalia and vagina normal.  Cervix normal small amount of blood.  No cervical motion tenderness.  Uterus is high in pelvis anteverted but not tender.  Adnexa negative.    PELVIC ULTRASOUND       Uterus: Anteverted.  Measures 8.1 x 4.7 x 4.0 cm, with " "a volume of 78.23 cc's.  Endometrial stripe measures 3.2 mm but there is a globular enlargement in the fundus measuring 9.2 mm x 5.6 mm.  This may either be a polyp or a clot.  This is also seen on 3-D, central in the uterine cavity.     Left ovary: Measures 1.4 x 1.9 x 1.2 cm, with a volume of 1.64 cc's.     Right ovary: Measures 1.5 x 2.4 x 2.0 cm, with a volume of 3.58 cc's.  There's been complete resolution of the right ovarian cyst seen before.     Cul-de-sac: No free fluid or mass.          ASSESSMENT: Normal anteverted uterus with either a clot or a polyp in the fundus.  Complete resolution of the right ovarian cyst.  No free fluid.       RECOMMENDATION: Richelle removed her NuvaRing today and I expect that she will start a period through the weekend.  Recommended she start back with the NuvaRing on Tuesday or Wednesday to continue using it continuously.  Also recommended she not have intercourse for the next couple weeks and we will see if she has any further bleeding.  We may want to repeat her ultrasound before she has intercourse again to see if this \"clot or polyp\"  in the fundus resolves.     The right ovarian cyst has completely resolved.      Assessment/Plan   Richelle was seen today for follow-up.    Diagnoses and all orders for this visit:    Irregular periods/menstrual cycles  Comments:  While using the NuvaRing.  Orders:  -     POC Pregnancy, Urine    Pelvic pain  -     Chlamydia trachomatis, Neisseria gonorrhoeae, PCR w/ confirmation - Swab, Cervix    Cyst of right ovary  Comments:  Completely resolved.      COMMENTS: Right ovarian cyst has resolved.  She will leave the ring out through the weekend and then try again next week and not have intercourse for 2 weeks.  We may want to look at an ultrasound again to see if this clot or polyp in the fundus is resolved if not and she continues to have irregular bleeding would want to consider hysteroscopy with D&C.    Ted Cooper M.D.         " 9/23/2018  (late note completion)

## 2018-09-23 LAB
C TRACH RRNA SPEC QL NAA+PROBE: NEGATIVE
N GONORRHOEA RRNA SPEC QL NAA+PROBE: NEGATIVE

## 2018-09-24 ENCOUNTER — TELEPHONE (OUTPATIENT)
Dept: OBSTETRICS AND GYNECOLOGY | Age: 23
End: 2018-09-24

## 2018-09-24 NOTE — TELEPHONE ENCOUNTER
Her insurance won't cover it for 3 wks with immediate replacement.  They require a PA which we can do if needed. The nuva ring is actually effective for 4 wks so she can keep one in for 4 wks, take it out and put a new one right back in.

## 2018-09-24 NOTE — TELEPHONE ENCOUNTER
Seen Dr Cooper last week, we sent in Rx for nuvaring to leave in for 4 weeks and replace immed. Dr KEY said to leave nuva ring in for 3 wks then replace immediately. A new Rx needs to be sent in to reflect that so insurance will pay for.

## 2018-09-24 NOTE — TELEPHONE ENCOUNTER
Pt stated she did leave in for 4 wks and started bleeding, Dr Cooper advised pt that was too long to leave in.   Start PA process?

## 2018-10-08 ENCOUNTER — TELEPHONE (OUTPATIENT)
Dept: OBSTETRICS AND GYNECOLOGY | Age: 23
End: 2018-10-08

## 2018-10-08 NOTE — TELEPHONE ENCOUNTER
I spoke with Richelle.  Her [employer] insurance is not giving us an approval to change her ring every 3 weeks, using it continuously.  I have a PA reference number, [id number].  I will need to try and call josue to get this approved.  Meanwhile Richelle will come by and  an extra ring or 2 to use continuously until we can get it authorized.

## 2018-10-08 NOTE — TELEPHONE ENCOUNTER
Patient states she is on week 3 of the nuva ring and has not had any break thru bleeding.  She is about to switch out for the next ring and wanted to give Dr. Cooper the update.

## 2018-10-16 ENCOUNTER — OFFICE VISIT (OUTPATIENT)
Dept: FAMILY MEDICINE CLINIC | Facility: CLINIC | Age: 23
End: 2018-10-16

## 2018-10-16 VITALS
TEMPERATURE: 98.7 F | HEIGHT: 68 IN | HEART RATE: 85 BPM | WEIGHT: 127 LBS | SYSTOLIC BLOOD PRESSURE: 108 MMHG | OXYGEN SATURATION: 98 % | BODY MASS INDEX: 19.25 KG/M2 | DIASTOLIC BLOOD PRESSURE: 74 MMHG

## 2018-10-16 DIAGNOSIS — R00.2 PALPITATIONS: ICD-10-CM

## 2018-10-16 DIAGNOSIS — Z13.1 SCREENING FOR DIABETES MELLITUS (DM): ICD-10-CM

## 2018-10-16 DIAGNOSIS — Z13.0 SCREENING FOR DEFICIENCY ANEMIA: ICD-10-CM

## 2018-10-16 DIAGNOSIS — F32.A ANXIETY AND DEPRESSION: Primary | ICD-10-CM

## 2018-10-16 DIAGNOSIS — F41.9 ANXIETY AND DEPRESSION: Primary | ICD-10-CM

## 2018-10-16 LAB
ALBUMIN SERPL-MCNC: 4.9 G/DL (ref 3.5–5.2)
ALBUMIN/GLOB SERPL: 1.9 G/DL
ALP SERPL-CCNC: 54 U/L (ref 39–117)
ALT SERPL-CCNC: 9 U/L (ref 1–33)
AST SERPL-CCNC: 10 U/L (ref 1–32)
BILIRUB SERPL-MCNC: 1.3 MG/DL (ref 0.1–1.2)
BUN SERPL-MCNC: 9 MG/DL (ref 6–20)
BUN/CREAT SERPL: 10.5 (ref 7–25)
CALCIUM SERPL-MCNC: 9.8 MG/DL (ref 8.6–10.5)
CHLORIDE SERPL-SCNC: 99 MMOL/L (ref 98–107)
CO2 SERPL-SCNC: 28.1 MMOL/L (ref 22–29)
CREAT SERPL-MCNC: 0.86 MG/DL (ref 0.57–1)
ERYTHROCYTE [DISTWIDTH] IN BLOOD BY AUTOMATED COUNT: 12.1 % (ref 11.7–13)
GLOBULIN SER CALC-MCNC: 2.6 GM/DL
GLUCOSE SERPL-MCNC: 88 MG/DL (ref 65–99)
HCT VFR BLD AUTO: 45.6 % (ref 35.6–45.5)
HGB BLD-MCNC: 15.4 G/DL (ref 11.9–15.5)
MCH RBC QN AUTO: 32.9 PG (ref 26.9–32)
MCHC RBC AUTO-ENTMCNC: 33.8 G/DL (ref 32.4–36.3)
MCV RBC AUTO: 97.4 FL (ref 80.5–98.2)
PLATELET # BLD AUTO: 343 10*3/MM3 (ref 140–500)
POTASSIUM SERPL-SCNC: 4.3 MMOL/L (ref 3.5–5.2)
PROT SERPL-MCNC: 7.5 G/DL (ref 6–8.5)
RBC # BLD AUTO: 4.68 10*6/MM3 (ref 3.9–5.2)
SODIUM SERPL-SCNC: 137 MMOL/L (ref 136–145)
TSH SERPL DL<=0.005 MIU/L-ACNC: 2.04 MIU/ML (ref 0.27–4.2)
WBC # BLD AUTO: 6.02 10*3/MM3 (ref 4.5–10.7)

## 2018-10-16 PROCEDURE — 99204 OFFICE O/P NEW MOD 45 MIN: CPT | Performed by: NURSE PRACTITIONER

## 2018-10-16 RX ORDER — CLINDAMYCIN PHOSPHATE 10 UG/ML
LOTION TOPICAL
COMMUNITY
Start: 2018-09-11 | End: 2021-10-05

## 2018-10-16 RX ORDER — SERTRALINE HYDROCHLORIDE 25 MG/1
25 TABLET, FILM COATED ORAL DAILY
Qty: 30 TABLET | Refills: 5 | Status: SHIPPED | OUTPATIENT
Start: 2018-10-16 | End: 2019-02-25 | Stop reason: DRUGHIGH

## 2018-10-16 NOTE — PROGRESS NOTES
Subjective   Richelle Burgess is a 23 y.o. female.     Chief Complaint   Patient presents with   • Anxiety     New pt to establish care     • Depression     Ms. Burgess presents today to establish care at this practice. She has not had a PCP since she had last seen her pediatrician. She states she is essentially healthy. She is concerned today because she is having increased anxiety. She has a lot of stress from going to graduate school, her grandfather recently had a stroke, her father lost his job and her mother is having a breast biopsy today. She states the anxiety started about a year ago but has been increasing over the past few months. There is a family history of anxiety and depression in her mother and brother. Associated symptoms are decreased concentration, depression, palpitations at times, feeling nervous/anxious and frustration/anger. She has not thought of harming herself or others.     I have reviewed the patient's medical history in detail and updated the computerized patient record.     The following portions of the patient's history were reviewed and updated as appropriate: allergies, current medications, past family history, past medical history, past social history, past surgical history and problem list.     Current Outpatient Prescriptions on File Prior to Visit   Medication Sig   • etonogestrel-ethinyl estradiol (NUVARING) 0.12-0.015 MG/24HR vaginal ring 1 pv x 4 wks, then replace immediately   • Multiple Vitamins-Minerals (MULTIVITAMIN WITH MINERALS) tablet tablet Take 1 tablet by mouth Daily.   • spironolactone (ALDACTONE) 100 MG tablet Take 100 mg by mouth Daily.     No current facility-administered medications on file prior to visit.        Review of Systems   Constitutional: Positive for activity change (no motivation).   Respiratory: Negative.    Cardiovascular: Positive for palpitations (sometimes).   Endocrine: Negative.    Musculoskeletal: Negative.    Neurological: Negative.     Psychiatric/Behavioral: Positive for agitation, decreased concentration and depressed mood. Negative for self-injury and suicidal ideas. The patient is nervous/anxious.        Objective    Vitals:    10/16/18 0843   BP: 108/74   Pulse: 85   Temp: 98.7 °F (37.1 °C)   SpO2: 98%     Physical Exam   Constitutional: She is oriented to person, place, and time. She appears well-developed and well-nourished.   HENT:   Right Ear: External ear normal.   Left Ear: External ear normal.   Mouth/Throat: Oropharynx is clear and moist.   Neck: Normal range of motion. Neck supple. No thyromegaly present.   Cardiovascular: Normal rate, regular rhythm, normal heart sounds and intact distal pulses.    Pulmonary/Chest: Effort normal and breath sounds normal.   Abdominal: Soft. Bowel sounds are normal.   Musculoskeletal: Normal range of motion. She exhibits no edema.   Lymphadenopathy:     She has no cervical adenopathy.   Neurological: She is alert and oriented to person, place, and time.   Skin: Skin is warm and dry.   Psychiatric:   No acute distress   Vitals reviewed.        Assessment/Plan   Richelle was seen today for anxiety and depression.    Diagnoses and all orders for this visit:    Anxiety and depression  -     sertraline (ZOLOFT) 25 MG tablet; Take 1 tablet by mouth Daily.    Palpitations  -     TSH    Screening for deficiency anemia  -     CBC (No Diff)    Screening for diabetes mellitus (DM)  -     Comprehensive Metabolic Panel    1. I have asked for her updated immunization records from her pediatrician.   2. I will assess her for diabetes, anemia and thyroid disorder as possible causes of anxiety/depression.  3. I will start her on Zoloft 25 mg daily.  4. She is to return in 4 weeks to follow up on anxiety/depression.

## 2018-11-13 ENCOUNTER — OFFICE VISIT (OUTPATIENT)
Dept: FAMILY MEDICINE CLINIC | Facility: CLINIC | Age: 23
End: 2018-11-13

## 2018-11-13 VITALS
WEIGHT: 122 LBS | BODY MASS INDEX: 18.49 KG/M2 | OXYGEN SATURATION: 98 % | RESPIRATION RATE: 14 BRPM | SYSTOLIC BLOOD PRESSURE: 120 MMHG | HEIGHT: 68 IN | HEART RATE: 78 BPM | TEMPERATURE: 98.1 F | DIASTOLIC BLOOD PRESSURE: 72 MMHG

## 2018-11-13 DIAGNOSIS — F32.A ANXIETY AND DEPRESSION: Primary | ICD-10-CM

## 2018-11-13 DIAGNOSIS — F41.9 ANXIETY AND DEPRESSION: Primary | ICD-10-CM

## 2018-11-13 PROCEDURE — 99213 OFFICE O/P EST LOW 20 MIN: CPT | Performed by: NURSE PRACTITIONER

## 2018-11-13 NOTE — PROGRESS NOTES
Subjective   Richelle Burgess is a 23 y.o. female.     Chief Complaint   Patient presents with   • Anxiety     1 month f/u    • Depression     Ms. Burgess presents today to follow up on anxiety/depression. I had initially seen her for anxiety/depression in October 2018. At that time her anxiety was interfering with her daily activities and causing her stress about school. I had started her on Zoloft 25 mg daily. She states that since starting the Zoloft her anxiety has decreased and she is able to concentrate in school. She states she feels much better.      I have reviewed the patient's medical history in detail and updated the computerized patient record.    The following portions of the patient's history were reviewed and updated as appropriate: allergies, current medications, past family history, past medical history, past social history, past surgical history and problem list.     Current Outpatient Medications on File Prior to Visit   Medication Sig Dispense Refill   • clindamycin (CLEOCIN T) 1 % lotion      • etonogestrel-ethinyl estradiol (NUVARING) 0.12-0.015 MG/24HR vaginal ring 1 pv x 4 wks, then replace immediately 1 each 12   • Multiple Vitamins-Minerals (MULTIVITAMIN WITH MINERALS) tablet tablet Take 1 tablet by mouth Daily.     • sertraline (ZOLOFT) 25 MG tablet Take 1 tablet by mouth Daily. 30 tablet 5   • spironolactone (ALDACTONE) 100 MG tablet Take 100 mg by mouth Daily.       No current facility-administered medications on file prior to visit.     s    Review of Systems   Constitutional: Negative.    Psychiatric/Behavioral: Negative for sleep disturbance. Decreased concentration: improving. Depressed mood: improving. Nervous/anxious: has improved a lot.         Vitals:    11/13/18 0845   BP: 120/72   Pulse: 78   Resp: 14   Temp: 98.1 °F (36.7 °C)   SpO2: 98%       Objective   Physical Exam   Constitutional: She is oriented to person, place, and time. She appears well-developed and well-nourished.    Cardiovascular: Regular rhythm and normal heart sounds.   Pulmonary/Chest: Effort normal and breath sounds normal.   Neurological: She is alert and oriented to person, place, and time.   Skin: Skin is warm and dry.   Psychiatric: She has a normal mood and affect. Her behavior is normal. Judgment and thought content normal.   Vitals reviewed.        Assessment/Plan   Richelle was seen today for anxiety and depression.    Diagnoses and all orders for this visit:    Anxiety and depression      1. Anxiety and depression has improved. She is to continue to take Zoloft 25 mg daily.   2. She is to continue all other medications as prescribed.  3. She is to follow up in October 2019 for her annual exam with fasting labs the week before.

## 2019-02-21 ENCOUNTER — TELEPHONE (OUTPATIENT)
Dept: OBSTETRICS AND GYNECOLOGY | Age: 24
End: 2019-02-21

## 2019-03-01 NOTE — TELEPHONE ENCOUNTER
Pt called Dr Cooper yesterday to verify that nuva rings would be [place] for her to  that evening. Pt didn't hear from Dr Cooper, so she didn't come. Pt mom Genesis gets off at 3:30 and will come pick the pt nuva rings up for her after work today.

## 2019-11-26 ENCOUNTER — OFFICE VISIT (OUTPATIENT)
Dept: FAMILY MEDICINE CLINIC | Facility: CLINIC | Age: 24
End: 2019-11-26

## 2019-11-26 VITALS
BODY MASS INDEX: 20.4 KG/M2 | SYSTOLIC BLOOD PRESSURE: 104 MMHG | TEMPERATURE: 98.3 F | WEIGHT: 130 LBS | RESPIRATION RATE: 16 BRPM | OXYGEN SATURATION: 98 % | HEART RATE: 85 BPM | DIASTOLIC BLOOD PRESSURE: 64 MMHG | HEIGHT: 67 IN

## 2019-11-26 DIAGNOSIS — J01.40 ACUTE NON-RECURRENT PANSINUSITIS: Primary | ICD-10-CM

## 2019-11-26 PROCEDURE — 99213 OFFICE O/P EST LOW 20 MIN: CPT | Performed by: NURSE PRACTITIONER

## 2019-11-26 RX ORDER — AMOXICILLIN AND CLAVULANATE POTASSIUM 875; 125 MG/1; MG/1
1 TABLET, FILM COATED ORAL 2 TIMES DAILY
Qty: 14 TABLET | Refills: 0 | Status: SHIPPED | OUTPATIENT
Start: 2019-11-26 | End: 2019-12-03

## 2019-11-26 NOTE — PROGRESS NOTES
Subjective   Richelle Burgess is a 24 y.o. female.     Chief Complaint   Patient presents with   • Cough   • Nasal Congestion     URI    This is a new problem. The current episode started in the past 7 days. The problem has been gradually worsening. There has been no fever. Associated symptoms include congestion, coughing, headaches, a plugged ear sensation, rhinorrhea and sinus pain. Pertinent negatives include no sore throat or wheezing. Treatments tried: OTC cough/cold medications. The treatment provided mild relief.      I have reviewed the patient's medical history in detail and updated the computerized patient record.    The following portions of the patient's history were reviewed and updated as appropriate: allergies, current medications, past family history, past medical history, past social history, past surgical history and problem list.       Current Outpatient Medications:   •  clindamycin (CLEOCIN T) 1 % lotion, , Disp: , Rfl:   •  etonogestrel-ethinyl estradiol (NUVARING) 0.12-0.015 MG/24HR vaginal ring, 1 pv x 4 wks, then replace immediately, Disp: 1 each, Rfl: 12  •  Multiple Vitamins-Minerals (MULTIVITAMIN WITH MINERALS) tablet tablet, Take 1 tablet by mouth Daily., Disp: , Rfl:   •  sertraline (ZOLOFT) 50 MG tablet, TAKE ONE TABLET BY MOUTH DAILY, Disp: 30 tablet, Rfl: 4  •  spironolactone (ALDACTONE) 100 MG tablet, Take 100 mg by mouth Daily., Disp: , Rfl:   •  amoxicillin-clavulanate (AUGMENTIN) 875-125 MG per tablet, Take 1 tablet by mouth 2 (Two) Times a Day for 7 days., Disp: 14 tablet, Rfl: 0    Review of Systems   Constitutional: Negative for chills and fever.   HENT: Positive for congestion and rhinorrhea. Negative for sore throat.    Respiratory: Positive for cough. Negative for shortness of breath and wheezing.    Cardiovascular: Negative.    Neurological: Positive for headache.        Vitals:    11/26/19 1009   BP: 104/64   BP Location: Left arm   Patient Position: Sitting   Cuff Size:  "Adult   Pulse: 85   Resp: 16   Temp: 98.3 °F (36.8 °C)   TempSrc: Oral   SpO2: 98%   Weight: 59 kg (130 lb)   Height: 170.2 cm (67\")       Objective   Physical Exam   Constitutional: She is oriented to person, place, and time. She appears well-developed and well-nourished. She has a sickly appearance.   HENT:   Right Ear: Tympanic membrane normal.   Left Ear: Tympanic membrane normal.   Nose: Mucosal edema, rhinorrhea and congestion present. Right sinus exhibits maxillary sinus tenderness and frontal sinus tenderness. Left sinus exhibits maxillary sinus tenderness and frontal sinus tenderness.   Mouth/Throat: Uvula is midline and mucous membranes are normal. Posterior oropharyngeal erythema present. No oropharyngeal exudate.   Cardiovascular: Normal rate, regular rhythm and normal heart sounds.   Pulmonary/Chest: Effort normal and breath sounds normal.   Neurological: She is alert and oriented to person, place, and time.   Skin: Skin is warm and dry.   Psychiatric:   No acute distress   Vitals reviewed.        Assessment/Plan   Richelle was seen today for cough and nasal congestion.    Diagnoses and all orders for this visit:    Acute non-recurrent pansinusitis    Other orders  -     amoxicillin-clavulanate (AUGMENTIN) 875-125 MG per tablet; Take 1 tablet by mouth 2 (Two) Times a Day for 7 days.        You have been diagnosed with acute sinusitis. You have been prescribed an antibiotic along with symptomatic treatment.  You may take Mucinex D for relieving congestion and cough.  If you have high blood pressure, do not take Mucinex D, instead opting for plain Mucinex and Coricidin HBP.  Take Ibuprofen or Tylenol as needed for pain or fever.  Oral antihistamine, such as Zyrtec or Claritin may help reduce ear pressure and relieve some nasal symptoms.  A saline nasal spray may be used to keep nose clear from discharge.  Be sure that you are increasing your intake of clear to decaffeinated fluids and get plenty of rest.  " If your symptoms worsen or persist follow up as needed.

## 2019-12-06 RX ORDER — FLUCONAZOLE 100 MG/1
100 TABLET ORAL DAILY
Qty: 3 TABLET | Refills: 0 | Status: SHIPPED | OUTPATIENT
Start: 2019-12-06 | End: 2019-12-09

## 2020-04-15 RX ORDER — ETONOGESTREL AND ETHINYL ESTRADIOL 11.7; 2.7 MG/1; MG/1
INSERT, EXTENDED RELEASE VAGINAL
Qty: 3 EACH | Refills: 1 | Status: CANCELLED | OUTPATIENT
Start: 2020-04-15

## 2020-06-17 ENCOUNTER — OFFICE VISIT (OUTPATIENT)
Dept: FAMILY MEDICINE CLINIC | Facility: CLINIC | Age: 25
End: 2020-06-17

## 2020-06-17 VITALS
TEMPERATURE: 98.4 F | SYSTOLIC BLOOD PRESSURE: 112 MMHG | BODY MASS INDEX: 21.03 KG/M2 | DIASTOLIC BLOOD PRESSURE: 80 MMHG | WEIGHT: 134 LBS | OXYGEN SATURATION: 99 % | HEART RATE: 85 BPM | HEIGHT: 67 IN

## 2020-06-17 DIAGNOSIS — F32.A ANXIETY AND DEPRESSION: Primary | ICD-10-CM

## 2020-06-17 DIAGNOSIS — F41.9 ANXIETY AND DEPRESSION: Primary | ICD-10-CM

## 2020-06-17 DIAGNOSIS — Z00.00 ANNUAL PHYSICAL EXAM: ICD-10-CM

## 2020-06-17 PROCEDURE — 99213 OFFICE O/P EST LOW 20 MIN: CPT | Performed by: NURSE PRACTITIONER

## 2020-06-17 RX ORDER — SERTRALINE HYDROCHLORIDE 25 MG/1
25 TABLET, FILM COATED ORAL DAILY
Qty: 90 TABLET | Refills: 3 | Status: SHIPPED | OUTPATIENT
Start: 2020-06-17 | End: 2020-09-15 | Stop reason: SDUPTHER

## 2020-06-17 NOTE — PROGRESS NOTES
Subjective   Richelle Burgess is a 24 y.o. female.     Chief Complaint   Patient presents with   • Anxiety     Ms Burgess presents today to follow up on her anxiety and depression. She reports that her anxiety and depression have improved significantly now that she has finished grad school and her mother is no longer going through chemotherapy and appears to be doing well. She would like to cut back on the Zoloft dose from 50 mg to 25 mg to see how she does on a lower dose.        I have reviewed the patient's medical history in detail and updated the computerized patient record.    The following portions of the patient's history were reviewed and updated as appropriate: allergies, current medications, past family history, past medical history, past social history, past surgical history and problem list.      Current Outpatient Medications:   •  clindamycin (CLEOCIN T) 1 % lotion, , Disp: , Rfl:   •  etonogestrel-ethinyl estradiol (NUVARING) 0.12-0.015 MG/24HR vaginal ring, insert 1 vaginal ring by vaginal route continously every 3 weeks, Disp: 3 each, Rfl: 1  •  Multiple Vitamins-Minerals (MULTIVITAMIN WITH MINERALS) tablet tablet, Take 1 tablet by mouth Daily., Disp: , Rfl:   •  sertraline (ZOLOFT) 50 MG tablet, Take 1 tablet by mouth Daily., Disp: 90 tablet, Rfl: 0     Review of Systems   Constitutional: Negative.    Respiratory: Negative.    Cardiovascular: Negative.    Gastrointestinal: Negative.    Psychiatric/Behavioral: Negative.  Negative for decreased concentration, depressed mood and stress (outside stressors of school and family illness is resolved). The patient is not nervous/anxious (has improved significantly with the medications).        Objective    Vitals:    06/17/20 1016   BP: 112/80   Pulse: 85   Temp: 98.4 °F (36.9 °C)   SpO2: 99%     Physical Exam   Constitutional: She is oriented to person, place, and time. She appears well-developed and well-nourished.   Cardiovascular: Normal rate, regular  rhythm, normal heart sounds and intact distal pulses.   Pulmonary/Chest: Breath sounds normal.   Neurological: She is alert and oriented to person, place, and time.   Skin: Skin is warm and dry.   Psychiatric:   No acute distress   Vitals reviewed.        Assessment/Plan   Richelle was seen today for anxiety.    Diagnoses and all orders for this visit:    Anxiety and depression  -     sertraline (ZOLOFT) 25 MG tablet; Take 1 tablet by mouth Daily.      Ms. Burgess appears to be doing well today. We will decrease the Zoloft from 50 mg daily to 25 mg daily and see how she does on the lower dose.   She is to follow up in 3 months for an annual physical exam with fasting labs the week before.

## 2020-08-26 ENCOUNTER — OFFICE VISIT (OUTPATIENT)
Dept: FAMILY MEDICINE CLINIC | Facility: CLINIC | Age: 25
End: 2020-08-26

## 2020-08-26 VITALS
BODY MASS INDEX: 21.66 KG/M2 | HEART RATE: 91 BPM | WEIGHT: 138 LBS | OXYGEN SATURATION: 98 % | DIASTOLIC BLOOD PRESSURE: 76 MMHG | SYSTOLIC BLOOD PRESSURE: 120 MMHG | HEIGHT: 67 IN | TEMPERATURE: 99.8 F

## 2020-08-26 DIAGNOSIS — L73.9 FOLLICULITIS: Primary | ICD-10-CM

## 2020-08-26 PROCEDURE — 99213 OFFICE O/P EST LOW 20 MIN: CPT | Performed by: NURSE PRACTITIONER

## 2020-08-26 RX ORDER — CEPHALEXIN 500 MG/1
500 CAPSULE ORAL 3 TIMES DAILY
Qty: 21 CAPSULE | Refills: 0 | Status: SHIPPED | OUTPATIENT
Start: 2020-08-26 | End: 2020-09-15

## 2020-08-26 RX ORDER — FLUCONAZOLE 100 MG/1
100 TABLET ORAL DAILY
Qty: 3 TABLET | Refills: 0 | Status: SHIPPED | OUTPATIENT
Start: 2020-08-26 | End: 2020-08-29

## 2020-08-26 NOTE — PROGRESS NOTES
Subjective   Richelle Burgess is a 24 y.o. female.     Chief Complaint   Patient presents with   • Wound Check     RT elbow     Ms. Burgess presents today with an infected hair follicle in her right antecubital space. She reports it started as a small white head that she squeezed. Within a day the area increased in size, and became red and painful. There is no drainage at this time.         I have reviewed the patient's medical history in detail and updated the computerized patient record.    The following portions of the patient's history were reviewed and updated as appropriate: allergies, current medications, past family history, past medical history, past social history, past surgical history and problem list.      Current Outpatient Medications:   •  clindamycin (CLEOCIN T) 1 % lotion, , Disp: , Rfl:   •  etonogestrel-ethinyl estradiol (NUVARING) 0.12-0.015 MG/24HR vaginal ring, Insert 1 vaginal ring by vaginal route continously every 3 weeks, Disp: 3 each, Rfl: 4  •  Multiple Vitamins-Minerals (MULTIVITAMIN WITH MINERALS) tablet tablet, Take 1 tablet by mouth Daily., Disp: , Rfl:   •  sertraline (ZOLOFT) 25 MG tablet, Take 1 tablet by mouth Daily., Disp: 90 tablet, Rfl: 3     Review of Systems   Constitutional: Negative for chills and fever.   Respiratory: Negative.    Cardiovascular: Negative.    Musculoskeletal: Negative.    Skin: Positive for skin lesions (infected pimple on right arm).   Neurological: Negative.        Objective    Vitals:    08/26/20 1429   BP: 120/76   Pulse: 91   Temp: 99.8 °F (37.7 °C)   SpO2: 98%     Physical Exam   Constitutional: She is oriented to person, place, and time. She appears well-developed and well-nourished.   Neurological: She is alert and oriented to person, place, and time.   Skin:   Infected hair follicle just on the outer aspect of her right antecubital fossa. The are is about .5 cm in diameter, red, swollen with a white center.    Vitals  reviewed.        Assessment/Plan   Richelle was seen today for wound check.    Diagnoses and all orders for this visit:    Folliculitis  -     cephalexin (KEFLEX) 500 MG capsule; Take 1 capsule by mouth 3 (Three) Times a Day.    Other orders  -     fluconazole (DIFLUCAN) 100 MG tablet; Take 1 tablet by mouth Daily for 3 days.      Ms. Burgess presents today with Folliculitis of the right arm. She is to start Keflex 500 mg three times a day for 7 days. She also get vaginal yeast infection while taking antibiotics, so she may take Fluconazole 100 mg x 3 days as instructed.  She has been instructed to keep the area on her arm clean and dry.  She is to follow up as needed and at her next scheduled appointment.

## 2020-09-06 ENCOUNTER — RESULTS ENCOUNTER (OUTPATIENT)
Dept: FAMILY MEDICINE CLINIC | Facility: CLINIC | Age: 25
End: 2020-09-06

## 2020-09-06 DIAGNOSIS — Z00.00 ANNUAL PHYSICAL EXAM: ICD-10-CM

## 2020-09-10 LAB
ALBUMIN SERPL-MCNC: 4.6 G/DL (ref 3.5–5.2)
ALBUMIN/GLOB SERPL: 2.7 G/DL
ALP SERPL-CCNC: 72 U/L (ref 39–117)
ALT SERPL-CCNC: 11 U/L (ref 1–33)
AST SERPL-CCNC: 13 U/L (ref 1–32)
BILIRUB SERPL-MCNC: 0.7 MG/DL (ref 0–1.2)
BUN SERPL-MCNC: 7 MG/DL (ref 6–20)
BUN/CREAT SERPL: 9.6 (ref 7–25)
CALCIUM SERPL-MCNC: 9.1 MG/DL (ref 8.6–10.5)
CHLORIDE SERPL-SCNC: 105 MMOL/L (ref 98–107)
CHOLEST SERPL-MCNC: 139 MG/DL (ref 0–200)
CO2 SERPL-SCNC: 28.7 MMOL/L (ref 22–29)
CREAT SERPL-MCNC: 0.73 MG/DL (ref 0.57–1)
ERYTHROCYTE [DISTWIDTH] IN BLOOD BY AUTOMATED COUNT: 12 % (ref 12.3–15.4)
GLOBULIN SER CALC-MCNC: 1.7 GM/DL
GLUCOSE SERPL-MCNC: 82 MG/DL (ref 65–99)
HCT VFR BLD AUTO: 41.3 % (ref 34–46.6)
HDLC SERPL-MCNC: 71 MG/DL (ref 40–60)
HGB BLD-MCNC: 14.1 G/DL (ref 12–15.9)
LDLC SERPL CALC-MCNC: 50 MG/DL (ref 0–100)
MCH RBC QN AUTO: 32 PG (ref 26.6–33)
MCHC RBC AUTO-ENTMCNC: 34.1 G/DL (ref 31.5–35.7)
MCV RBC AUTO: 93.7 FL (ref 79–97)
PLATELET # BLD AUTO: 296 10*3/MM3 (ref 140–450)
POTASSIUM SERPL-SCNC: 4.1 MMOL/L (ref 3.5–5.2)
PROT SERPL-MCNC: 6.3 G/DL (ref 6–8.5)
RBC # BLD AUTO: 4.41 10*6/MM3 (ref 3.77–5.28)
SODIUM SERPL-SCNC: 142 MMOL/L (ref 136–145)
TRIGL SERPL-MCNC: 90 MG/DL (ref 0–150)
VLDLC SERPL CALC-MCNC: 18 MG/DL
WBC # BLD AUTO: 6.41 10*3/MM3 (ref 3.4–10.8)

## 2020-09-15 ENCOUNTER — OFFICE VISIT (OUTPATIENT)
Dept: FAMILY MEDICINE CLINIC | Facility: CLINIC | Age: 25
End: 2020-09-15

## 2020-09-15 VITALS
BODY MASS INDEX: 21.66 KG/M2 | HEART RATE: 78 BPM | HEIGHT: 67 IN | DIASTOLIC BLOOD PRESSURE: 64 MMHG | WEIGHT: 138 LBS | TEMPERATURE: 98.4 F | OXYGEN SATURATION: 99 % | SYSTOLIC BLOOD PRESSURE: 116 MMHG

## 2020-09-15 DIAGNOSIS — Z00.00 ENCOUNTER FOR PREVENTIVE HEALTH EXAMINATION: Primary | ICD-10-CM

## 2020-09-15 DIAGNOSIS — F32.A ANXIETY AND DEPRESSION: ICD-10-CM

## 2020-09-15 DIAGNOSIS — F41.9 ANXIETY AND DEPRESSION: ICD-10-CM

## 2020-09-15 PROCEDURE — 99395 PREV VISIT EST AGE 18-39: CPT | Performed by: NURSE PRACTITIONER

## 2020-09-15 RX ORDER — SERTRALINE HYDROCHLORIDE 25 MG/1
25 TABLET, FILM COATED ORAL DAILY
Qty: 90 TABLET | Refills: 0 | Status: SHIPPED | OUTPATIENT
Start: 2020-09-15 | End: 2020-11-17 | Stop reason: SDUPTHER

## 2020-09-15 NOTE — PROGRESS NOTES
Subjective   Richelle Burgess is a 24 y.o. female.     Chief Complaint   Patient presents with   • Annual Exam     Ms. Burgess presents for an annual preventive health exam. She states she believes she was doing better on the 50 mg Zoloft than the 25 mg which was reduced at her last visit per her request. She was hoping to decrease the medication dose since her stress has decreased. She states she does not feel as if the Zoloft is doing anything.        I have reviewed the patient's medical history in detail and updated the computerized patient record.    The following portions of the patient's history were reviewed and updated as appropriate: allergies, current medications, past family history, past medical history, past social history, past surgical history and problem list.      Current Outpatient Medications:   •  clindamycin (CLEOCIN T) 1 % lotion, , Disp: , Rfl:   •  etonogestrel-ethinyl estradiol (NUVARING) 0.12-0.015 MG/24HR vaginal ring, Insert 1 vaginal ring by vaginal route continously every 3 weeks, Disp: 3 each, Rfl: 4  •  Multiple Vitamins-Minerals (MULTIVITAMIN WITH MINERALS) tablet tablet, Take 1 tablet by mouth Daily., Disp: , Rfl:   •  sertraline (ZOLOFT) 25 MG tablet, Take 1 tablet by mouth Daily., Disp: 90 tablet, Rfl: 3     Review of Systems   Constitutional: Negative.    HENT: Negative.    Eyes: Negative.    Respiratory: Negative.    Cardiovascular: Negative.    Gastrointestinal: Negative.    Endocrine: Negative.    Genitourinary: Negative.    Musculoskeletal: Negative.    Neurological: Negative.    Psychiatric/Behavioral: Negative.        Objective    Vitals:    09/15/20 1029   BP: 116/64   Pulse: 78   Temp: 98.4 °F (36.9 °C)   SpO2: 99%     Body mass index is 21.61 kg/m².     Physical Exam  Constitutional:       Appearance: Normal appearance. She is normal weight.   HENT:      Right Ear: Tympanic membrane normal.      Left Ear: Tympanic membrane normal.   Neck:      Musculoskeletal: Normal  range of motion.   Cardiovascular:      Rate and Rhythm: Normal rate and regular rhythm.      Pulses: Normal pulses.      Heart sounds: Normal heart sounds.   Pulmonary:      Effort: Pulmonary effort is normal.      Breath sounds: Normal breath sounds.   Musculoskeletal: Normal range of motion.         General: No swelling.   Skin:     General: Skin is warm and dry.   Neurological:      Mental Status: She is alert and oriented to person, place, and time. Mental status is at baseline.           Assessment/Plan   Richelle was seen today for annual exam.    Diagnoses and all orders for this visit:    Encounter for preventive health examination    Anxiety and depression  -     sertraline (ZOLOFT) 25 MG tablet; Take 1 tablet by mouth Daily.      Ms. Burgess's physical exam is unremarkable for her today.  I have reviewed her labs with her. All of her labs are normal. Her HDL is slightly elevated.  I have discussed with her about her anxiety. I have suggested she take 1.5 tabs of the Sertraline to see if this will help with her anxiety. If it does not she it to take 2 tabs for a total of 50 mg daily.  We discussed age appropriate healthy behaviors such as social distancing, wearing a mask, wearing seat belts, not drinking or textting when driving.   She is to follow up as needed and in 1 year for her next annual exam.

## 2020-11-17 DIAGNOSIS — F32.A ANXIETY AND DEPRESSION: ICD-10-CM

## 2020-11-17 DIAGNOSIS — F41.9 ANXIETY AND DEPRESSION: ICD-10-CM

## 2021-03-10 DIAGNOSIS — F41.9 ANXIETY AND DEPRESSION: ICD-10-CM

## 2021-03-10 DIAGNOSIS — F32.A ANXIETY AND DEPRESSION: ICD-10-CM

## 2021-04-16 ENCOUNTER — BULK ORDERING (OUTPATIENT)
Dept: CASE MANAGEMENT | Facility: OTHER | Age: 26
End: 2021-04-16

## 2021-04-16 DIAGNOSIS — Z23 IMMUNIZATION DUE: ICD-10-CM

## 2021-08-30 DIAGNOSIS — Z00.00 ANNUAL PHYSICAL EXAM: Primary | ICD-10-CM

## 2021-08-30 DIAGNOSIS — Z11.59 ENCOUNTER FOR HEPATITIS C SCREENING TEST FOR LOW RISK PATIENT: ICD-10-CM

## 2021-10-05 ENCOUNTER — OFFICE VISIT (OUTPATIENT)
Dept: FAMILY MEDICINE CLINIC | Facility: CLINIC | Age: 26
End: 2021-10-05

## 2021-10-05 VITALS
HEART RATE: 91 BPM | BODY MASS INDEX: 21.03 KG/M2 | DIASTOLIC BLOOD PRESSURE: 68 MMHG | TEMPERATURE: 98.6 F | WEIGHT: 134 LBS | OXYGEN SATURATION: 99 % | SYSTOLIC BLOOD PRESSURE: 98 MMHG | HEIGHT: 67 IN

## 2021-10-05 DIAGNOSIS — R19.7 DIARRHEA, UNSPECIFIED TYPE: Primary | ICD-10-CM

## 2021-10-05 DIAGNOSIS — F41.9 ANXIETY AND DEPRESSION: ICD-10-CM

## 2021-10-05 DIAGNOSIS — F32.A ANXIETY AND DEPRESSION: ICD-10-CM

## 2021-10-05 PROCEDURE — 99213 OFFICE O/P EST LOW 20 MIN: CPT | Performed by: NURSE PRACTITIONER

## 2021-10-05 RX ORDER — METRONIDAZOLE 250 MG/1
250 TABLET ORAL 3 TIMES DAILY
Qty: 30 TABLET | Refills: 0 | Status: ON HOLD | OUTPATIENT
Start: 2021-10-05 | End: 2022-05-14

## 2021-10-05 RX ORDER — FLUCONAZOLE 100 MG/1
100 TABLET ORAL DAILY
Qty: 3 TABLET | Refills: 0 | Status: SHIPPED | OUTPATIENT
Start: 2021-10-05 | End: 2021-10-08

## 2021-10-05 NOTE — PROGRESS NOTES
"Chief Complaint  Diarrhea (Since Sunday - 2 bouts during sleep) and Abdominal Pain (Since Saturday)    Subjective          Richelle Burgess presents to [facility]  Diarrhea   This is a new problem. The current episode started in the past 7 days (started 3 days ago). The stool consistency is described as watery. Associated symptoms include bloating. Pertinent negatives include no chills, coughing or sweats. Nothing aggravates the symptoms. 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 She has tried change of diet for the symptoms. The treatment provided no relief.     Current Outpatient Medications on File Prior to Visit   Medication Sig   • etonogestrel-ethinyl estradiol (NUVARING) 0.12-0.015 MG/24HR vaginal ring Insert one ring intravaginally for 3 weeks, then remove for one week, as directed   • Multiple Vitamins-Minerals (MULTIVITAMIN WITH MINERALS) tablet tablet Take 1 tablet by mouth Daily.   • [DISCONTINUED] sertraline (ZOLOFT) 50 MG tablet Take 1 tablet by mouth Daily.   • [DISCONTINUED] clindamycin (CLEOCIN T) 1 % lotion    • [DISCONTINUED] etonogestrel-ethinyl estradiol (NUVARING) 0.12-0.015 MG/24HR vaginal ring Insert 1 vaginal ring by vaginal route continously every 3 weeks   • [DISCONTINUED] etonogestrel-ethinyl estradiol (NUVARING) 0.12-0.015 MG/24HR vaginal ring Insert 1 each into the vagina Every 21 (Twenty-One) Days continuously     No current facility-administered medications on file prior to visit.     Objective   Vital Signs:   BP 98/68 (BP Location: Right arm, Patient Position: Sitting, Cuff Size: Adult)   Pulse 91   Temp 98.6 °F (37 °C) (Oral)   Ht 170.2 cm (67\")   Wt 60.8 kg (134 lb)   SpO2 99%   BMI 20.99 kg/m²     Physical Exam  Vitals reviewed.   Constitutional:       Appearance: Normal appearance. She is normal weight.   Cardiovascular:      Rate and Rhythm: Normal rate and regular rhythm.      Pulses: Normal pulses.      Heart sounds: Normal heart sounds. "   Pulmonary:      Effort: Pulmonary effort is normal.      Breath sounds: Normal breath sounds.   Abdominal:      General: Abdomen is flat. Bowel sounds are decreased.      Palpations: Abdomen is soft.   Skin:     General: Skin is warm and dry.   Neurological:      Mental Status: She is alert and oriented to person, place, and time.   Psychiatric:      Comments: No acute distress        Result Review :                 Assessment and Plan    Diagnoses and all orders for this visit:    1. Diarrhea, unspecified type (Primary)  -     Ova & Parasite Examination - Stool, Per Rectum  -     Clostridium Difficile EIA - Stool, Per Rectum  -     Stool Culture (Reference Lab) - Stool, Per Rectum  -     metroNIDAZOLE (FLAGYL) 250 MG tablet; Take 1 tablet by mouth 3 (Three) Times a Day.  Dispense: 30 tablet; Refill: 0    2. Anxiety and depression  -     sertraline (ZOLOFT) 50 MG tablet; Take 1 tablet by mouth Daily.  Dispense: 90 tablet; Refill: 1    Other orders  -     fluconazole (DIFLUCAN) 100 MG tablet; Take 1 tablet by mouth Daily for 3 days.  Dispense: 3 tablet; Refill: 0    Ms. Burgess presents with complaints of diarrhea x 3 days. She reports she has been incontinent of diarrhea in her sleep x 2 nights. I have ordered the above labs for further evaluation. She is to start Flagyl 250 mg three times a day after she collects the stool specimens. She may also take OTC imodium for her symptoms.   I have provided her with Fluconazole because she reports getting vaginal yeast infections when taking antibiotics. I have also refilled her Zoloft for her today.     Follow Up   Return if symptoms worsen or fail to improve, for Next scheduled follow up.  Patient was given instructions and counseling regarding her condition or for health maintenance advice. Please see specific information pulled into the AVS if appropriate.

## 2021-10-12 ENCOUNTER — TELEPHONE (OUTPATIENT)
Dept: FAMILY MEDICINE CLINIC | Facility: CLINIC | Age: 26
End: 2021-10-12

## 2021-10-12 DIAGNOSIS — F41.9 ANXIETY AND DEPRESSION: ICD-10-CM

## 2021-10-12 DIAGNOSIS — F32.A ANXIETY AND DEPRESSION: ICD-10-CM

## 2021-10-12 NOTE — TELEPHONE ENCOUNTER
Jennifer RIVERA sent [employer] message to patient on 10/6/21.  Patient has not read the message.  Called and left message on patient's voicemail letting her know that if she is no longer having the symptoms, then she will not have to collect the samples.

## 2021-10-12 NOTE — TELEPHONE ENCOUNTER
Caller: Richelle Burgess    Relationship to patient: Self    Best call back number: [phone]     Patient is needing: PATIENT IS CALLING TO REQUEST THE FOLLOWING PRESCRIPTION TO BE SENT TO [facility] LISTED BELOW.  SHE STATES HER MOTHER GETS A DISCOUNTED PRICE AT [facility].  SHE STATES IT WAS SENT TO A LOCAL [facility] ON 10/05/21.  sertraline (ZOLOFT) 50 MG tablet       [facility]  [phone]

## 2022-05-02 ENCOUNTER — PREP FOR SURGERY (OUTPATIENT)
Dept: OTHER | Facility: HOSPITAL | Age: 27
End: 2022-05-02

## 2022-05-02 DIAGNOSIS — K62.5 RECTAL BLEEDING: Primary | ICD-10-CM

## 2022-05-03 ENCOUNTER — TELEPHONE (OUTPATIENT)
Dept: GASTROENTEROLOGY | Facility: CLINIC | Age: 27
End: 2022-05-03

## 2022-05-05 PROBLEM — K62.5 RECTAL BLEEDING: Status: ACTIVE | Noted: 2022-05-05

## 2022-05-05 NOTE — TELEPHONE ENCOUNTER
Spoke with pt scheduled at [facility] on sept 21 arrive at 1000 am [place]---[employer] instructional packet mailed

## 2022-05-11 DIAGNOSIS — K62.5 RECTAL BLEEDING: Primary | ICD-10-CM

## 2022-05-13 ENCOUNTER — ANESTHESIA EVENT (OUTPATIENT)
Dept: GASTROENTEROLOGY | Facility: HOSPITAL | Age: 27
End: 2022-05-13

## 2022-05-14 ENCOUNTER — HOSPITAL ENCOUNTER (OUTPATIENT)
Facility: HOSPITAL | Age: 27
Setting detail: HOSPITAL OUTPATIENT SURGERY
Discharge: HOME | End: 2022-05-14
Attending: INTERNAL MEDICINE | Admitting: INTERNAL MEDICINE

## 2022-05-14 ENCOUNTER — ANESTHESIA (OUTPATIENT)
Dept: GASTROENTEROLOGY | Facility: HOSPITAL | Age: 27
End: 2022-05-14

## 2022-05-14 VITALS
HEIGHT: 67 IN | OXYGEN SATURATION: 98 % | DIASTOLIC BLOOD PRESSURE: 85 MMHG | WEIGHT: 129.6 LBS | HEART RATE: 81 BPM | SYSTOLIC BLOOD PRESSURE: 115 MMHG | RESPIRATION RATE: 16 BRPM | BODY MASS INDEX: 20.34 KG/M2

## 2022-05-14 DIAGNOSIS — K62.5 RECTAL BLEEDING: ICD-10-CM

## 2022-05-14 LAB
B-HCG UR QL: NEGATIVE
EXPIRATION DATE: NORMAL
INTERNAL NEGATIVE CONTROL: NEGATIVE
INTERNAL POSITIVE CONTROL: POSITIVE
Lab: NORMAL

## 2022-05-14 PROCEDURE — 81025 URINE PREGNANCY TEST: CPT | Performed by: INTERNAL MEDICINE

## 2022-05-14 PROCEDURE — S0260 H&P FOR SURGERY: HCPCS | Performed by: INTERNAL MEDICINE

## 2022-05-14 PROCEDURE — 25010000002 PROPOFOL 10 MG/ML EMULSION: Performed by: ANESTHESIOLOGY

## 2022-05-14 PROCEDURE — 45378 DIAGNOSTIC COLONOSCOPY: CPT | Performed by: INTERNAL MEDICINE

## 2022-05-14 RX ORDER — PROPOFOL 10 MG/ML
VIAL (ML) INTRAVENOUS CONTINUOUS PRN
Status: DISCONTINUED | OUTPATIENT
Start: 2022-05-14 | End: 2022-05-14 | Stop reason: SURG

## 2022-05-14 RX ORDER — SODIUM CHLORIDE 0.9 % (FLUSH) 0.9 %
10 SYRINGE (ML) INJECTION AS NEEDED
Status: DISCONTINUED | OUTPATIENT
Start: 2022-05-14 | End: 2022-05-14 | Stop reason: HOSPADM

## 2022-05-14 RX ORDER — HYDROCORTISONE 25 MG/G
CREAM TOPICAL 2 TIMES DAILY
Qty: 28 G | Refills: 2 | Status: SHIPPED | OUTPATIENT
Start: 2022-05-14 | End: 2022-05-24

## 2022-05-14 RX ORDER — LIDOCAINE HYDROCHLORIDE 20 MG/ML
INJECTION, SOLUTION INFILTRATION; PERINEURAL AS NEEDED
Status: DISCONTINUED | OUTPATIENT
Start: 2022-05-14 | End: 2022-05-14 | Stop reason: SURG

## 2022-05-14 RX ORDER — SODIUM CHLORIDE, SODIUM LACTATE, POTASSIUM CHLORIDE, CALCIUM CHLORIDE 600; 310; 30; 20 MG/100ML; MG/100ML; MG/100ML; MG/100ML
30 INJECTION, SOLUTION INTRAVENOUS CONTINUOUS PRN
Status: DISCONTINUED | OUTPATIENT
Start: 2022-05-14 | End: 2022-05-14 | Stop reason: HOSPADM

## 2022-05-14 RX ORDER — PROPOFOL 10 MG/ML
VIAL (ML) INTRAVENOUS AS NEEDED
Status: DISCONTINUED | OUTPATIENT
Start: 2022-05-14 | End: 2022-05-14 | Stop reason: SURG

## 2022-05-14 RX ORDER — SODIUM CHLORIDE 0.9 % (FLUSH) 0.9 %
10 SYRINGE (ML) INJECTION EVERY 12 HOURS SCHEDULED
Status: DISCONTINUED | OUTPATIENT
Start: 2022-05-14 | End: 2022-05-14 | Stop reason: HOSPADM

## 2022-05-14 RX ADMIN — PROPOFOL 100 MG: 10 INJECTION, EMULSION INTRAVENOUS at 08:22

## 2022-05-14 RX ADMIN — LIDOCAINE HYDROCHLORIDE 60 MG: 20 INJECTION, SOLUTION INFILTRATION; PERINEURAL at 08:21

## 2022-05-14 RX ADMIN — SODIUM CHLORIDE, POTASSIUM CHLORIDE, SODIUM LACTATE AND CALCIUM CHLORIDE: 600; 310; 30; 20 INJECTION, SOLUTION INTRAVENOUS at 08:20

## 2022-05-14 RX ADMIN — Medication 200 MCG/KG/MIN: at 08:23

## 2022-05-14 NOTE — H&P
[facility]  Pre Procedure History & Physical    Chief Complaint:   Rectal bleeding    Subjective     HPI:   Patient 26-year-old female with no significant past medical history presenting for rectal bleeding.  Patient denies abdominal pain no nausea vomiting fever chills.  Patient denies rectal pain.    Past Medical History:   Past Medical History:   Diagnosis Date   • Contraception management    • History of chicken pox    • Scoliosis 2008    Mild Scoliosis, no brace       Past Surgical History:  Past Surgical History:   Procedure Laterality Date   • CHOLECYSTECTOMY  12/2015    Stones, Dr Norton       Family History:  Family History   Problem Relation Age of Onset   • Hypertension Father    • Diabetes Father         type 2   • Diabetes Brother         Type 1   • Anxiety disorder Brother    • Diabetes Maternal Grandmother         Type 2   • Other Mother         cervical dysplasia   • Anxiety disorder Mother    • No Known Problems Sister    • Heart attack Paternal Grandfather    • Lung cancer Paternal Grandmother         COPD, smoker.   • Hypertension Maternal Grandfather    • Stroke Maternal Grandfather         Residual disability.       Social History:   reports that she has never smoked. She has never used smokeless tobacco. She reports current alcohol use of about 2.0 standard drinks of alcohol per week. She reports that she does not use drugs.    Medications:   Medications Prior to Admission   Medication Sig Dispense Refill Last Dose   • Multiple Vitamins-Minerals (MULTIVITAMIN WITH MINERALS) tablet tablet Take 1 tablet by mouth Daily.   5/13/2022 at Unknown time   • sertraline (ZOLOFT) 50 MG tablet Take 1 tablet by mouth Daily. 90 tablet 1 5/13/2022 at Unknown time   • etonogestrel-ethinyl estradiol (NUVARING) 0.12-0.015 MG/24HR vaginal ring Insert one ring intravaginally for 3 weeks, then remove for one week, as directed 3 each 3        Allergies:  Patient has no known  "allergies.    ROS:    Pertinent items are noted in HPI     Objective     Blood pressure 117/85, pulse 79, resp. rate 16, height 170.2 cm (67\"), weight 58.8 kg (129 lb 9.6 oz), last menstrual period 04/25/2022, SpO2 99 %, not currently breastfeeding.    Physical Exam   Constitutional: Pt is oriented to person, place, and time and well-developed, well-nourished, and in no distress.   Mouth/Throat: Oropharynx is clear and moist.   Neck: Normal range of motion.   Cardiovascular: Normal rate, regular rhythm and normal heart sounds.    Pulmonary/Chest: Effort normal and breath sounds normal.   Abdominal: Soft. Nontender  Skin: Skin is warm and dry.   Psychiatric: Mood, memory, affect and judgment normal.     Assessment & Plan     Diagnosis:  Rectal bleeding    Anticipated Surgical Procedure:  Colonoscopy    The risks, benefits, and alternatives of this procedure have been discussed with the patient or the responsible party- the patient understands and agrees to proceed.                                                          "

## 2022-05-14 NOTE — BRIEF OP NOTE
COLONOSCOPY  Progress Note    Richelle Burgess  5/14/2022    Pre-op Diagnosis:   Rectal bleeding [K62.5]       Post-Op Diagnosis Codes:     * Rectal bleeding [K62.5]     * Internal hemorrhoids [K64.8]    Procedure/CPT® Codes:        Procedure(s):  COLONOSCOPY to cecum/ terminal ileum    Surgeon(s):  Fili Hirsch MD    Anesthesia: Monitored Anesthesia Care    Staff:   Endo Technician: Ioana Arora  Endo Nurse: Gertrudis Ferreira RN         Estimated Blood Loss: none    Urine Voided: * No values recorded between 5/14/2022  8:19 AM and 5/14/2022  8:42 AM *    Specimens:                None          Drains: * No LDAs found *    Findings: Colonoscopy to the terminal ileum with normal ileum mucosa.  Colonic mucosa was normal throughout with 1+ internal hemorrhoids seen.        Complications: None          Fili Hirsch MD     Date: 5/14/2022  Time: 08:44 EDT

## 2022-05-14 NOTE — ANESTHESIA PREPROCEDURE EVALUATION
Anesthesia Evaluation     Patient summary reviewed and Nursing notes reviewed   NPO Solid Status: > 8 hours  NPO Liquid Status: > 8 hours           Airway   Mallampati: II  Neck ROM: full  No difficulty expected  Dental - normal exam     Pulmonary    Cardiovascular     Rhythm: regular    (+) valvular problems/murmurs,       Neuro/Psych  (+) psychiatric history Anxiety and Depression,    GI/Hepatic/Renal/Endo    (+)  GI bleeding ,     Musculoskeletal     Abdominal    Substance History      OB/GYN          Other          Other Comment: edouard                  Anesthesia Plan    ASA 2     MAC     intravenous induction     Anesthetic plan, all risks, benefits, and alternatives have been provided, discussed and informed consent has been obtained with: patient.        CODE STATUS:

## 2022-05-14 NOTE — ANESTHESIA POSTPROCEDURE EVALUATION
"Patient: Richelle Burgess    Procedure Summary     Date: 05/14/22 Room / Location: [facility] /  [facility]    Anesthesia Start: 0820 Anesthesia Stop: 0845    Procedure: COLONOSCOPY to cecum/ terminal ileum (N/A ) Diagnosis:       Rectal bleeding      Internal hemorrhoids      (Rectal bleeding [K62.5])    Surgeons: Fili Hirsch MD Provider: Francisco J Tapia MD    Anesthesia Type: MAC ASA Status: 2          Anesthesia Type: MAC    Vitals  Vitals Value Taken Time   /85 05/14/22 0907   Temp     Pulse 81 05/14/22 0907   Resp 16 05/14/22 0907   SpO2 98 % 05/14/22 0907           Post Anesthesia Care and Evaluation    Patient location during evaluation: bedside  Patient participation: complete - patient participated  Level of consciousness: sleepy but conscious  Pain score: 0  Pain management: adequate  Airway patency: patent  Anesthetic complications: No anesthetic complications    Cardiovascular status: acceptable  Respiratory status: acceptable  Hydration status: acceptable    Comments: /85 (BP Location: Left arm, Patient Position: Lying)   Pulse 81   Resp 16   Ht 170.2 cm (67\")   Wt 58.8 kg (129 lb 9.6 oz)   LMP 04/25/2022   SpO2 98%   BMI 20.30 kg/m²         "

## 2022-06-01 ENCOUNTER — TELEPHONE (OUTPATIENT)
Dept: FAMILY MEDICINE CLINIC | Facility: CLINIC | Age: 27
End: 2022-06-01

## 2022-06-01 DIAGNOSIS — F32.A ANXIETY AND DEPRESSION: ICD-10-CM

## 2022-06-01 DIAGNOSIS — F41.9 ANXIETY AND DEPRESSION: ICD-10-CM

## 2022-06-01 NOTE — TELEPHONE ENCOUNTER
Caller: Richelle Burgess    Relationship: Self    Best call back number: [phone]    Requested Prescriptions:   Requested Prescriptions     Pending Prescriptions Disp Refills   • sertraline (ZOLOFT) 50 MG tablet 90 tablet 1     Sig: Take 1 tablet by mouth Daily.        Pharmacy where request should be sent: [facility]     Additional details provided by patient:     Does the patient have less than a 3 day supply:  [] Yes  [x] No    Samir Benavides Rep   06/01/22 13:29 EDT

## 2022-10-31 DIAGNOSIS — F32.A ANXIETY AND DEPRESSION: ICD-10-CM

## 2022-10-31 DIAGNOSIS — F41.9 ANXIETY AND DEPRESSION: ICD-10-CM

## 2022-11-02 NOTE — TELEPHONE ENCOUNTER
PATIENT CALLED BACK TO CHECK ON THE STATUS OF THIS MEDICATION REFILL.  SHE SAYS THAT SHE IS COMPLETELY OUT OF THE MEDICATION AND THE REFILL SHOULD GO TO: [facility] AT [address] [place] [address] [phone].    SHE WOULD LIKE A CALL TO LET HER KNOW WHEN PRESCRIPTION IS SENT TO HER LOCAL PHARMACY.    PLEASE ADVISE.

## (undated) DEVICE — ADAPT CLN BIOGUARD AIR/H2O DISP

## (undated) DEVICE — SENSR O2 OXIMAX FNGR A/ 18IN NONSTR

## (undated) DEVICE — TUBING, SUCTION, 1/4" X 10', STRAIGHT: Brand: MEDLINE

## (undated) DEVICE — CANN O2 ETCO2 FITS ALL CONN CO2 SMPL A/ 7IN DISP LF

## (undated) DEVICE — KT ORCA ORCAPOD DISP STRL

## (undated) DEVICE — LN SMPL CO2 SHTRM SD STREAM W/M LUER